# Patient Record
Sex: MALE | Race: WHITE | NOT HISPANIC OR LATINO | Employment: FULL TIME | URBAN - METROPOLITAN AREA
[De-identification: names, ages, dates, MRNs, and addresses within clinical notes are randomized per-mention and may not be internally consistent; named-entity substitution may affect disease eponyms.]

---

## 2017-01-11 RX ORDER — HYDROCODONE BITARTRATE AND ACETAMINOPHEN 10; 325 MG/1; MG/1
TABLET ORAL
Qty: 90 TABLET | Refills: 0 | Status: SHIPPED | OUTPATIENT
Start: 2017-01-11 | End: 2017-02-01 | Stop reason: SDUPTHER

## 2017-01-11 NOTE — TELEPHONE ENCOUNTER
----- Message from Mikayla Torres sent at 1/11/2017  9:40 AM CST -----  Contact: pt [phone]  RX request - refill or new RX.  Is this a refill or new RX:  refill  RX name and strength: hydrocodone-acetaminophen 10-325mg (NORCO)  mg Tab  Directions:   Is this a 30 day or 90 day RX:  30    Comments:

## 2017-02-01 ENCOUNTER — OFFICE VISIT (OUTPATIENT)
Dept: INTERNAL MEDICINE | Facility: CLINIC | Age: 62
End: 2017-02-01
Payer: COMMERCIAL

## 2017-02-01 ENCOUNTER — LAB VISIT (OUTPATIENT)
Dept: LAB | Facility: HOSPITAL | Age: 62
End: 2017-02-01
Attending: INTERNAL MEDICINE
Payer: COMMERCIAL

## 2017-02-01 VITALS
HEART RATE: 100 BPM | TEMPERATURE: 98 F | DIASTOLIC BLOOD PRESSURE: 72 MMHG | RESPIRATION RATE: 16 BRPM | BODY MASS INDEX: 36.77 KG/M2 | WEIGHT: 220.69 LBS | HEIGHT: 65 IN | SYSTOLIC BLOOD PRESSURE: 120 MMHG

## 2017-02-01 DIAGNOSIS — M79.18 CHRONIC MUSCULOSKELETAL PAIN: ICD-10-CM

## 2017-02-01 DIAGNOSIS — I73.9 PAD (PERIPHERAL ARTERY DISEASE): ICD-10-CM

## 2017-02-01 DIAGNOSIS — I10 HTN (HYPERTENSION), BENIGN: Primary | ICD-10-CM

## 2017-02-01 DIAGNOSIS — G89.29 CHRONIC MUSCULOSKELETAL PAIN: ICD-10-CM

## 2017-02-01 DIAGNOSIS — E78.5 DYSLIPIDEMIA: ICD-10-CM

## 2017-02-01 DIAGNOSIS — R73.03 PREDIABETES: ICD-10-CM

## 2017-02-01 DIAGNOSIS — F17.200 TOBACCO USE DISORDER: ICD-10-CM

## 2017-02-01 DIAGNOSIS — F11.90 CHRONIC, CONTINUOUS USE OF OPIOIDS: ICD-10-CM

## 2017-02-01 LAB
ALBUMIN SERPL BCP-MCNC: 3.5 G/DL
ALP SERPL-CCNC: 88 U/L
ALT SERPL W/O P-5'-P-CCNC: 32 U/L
ANION GAP SERPL CALC-SCNC: 7 MMOL/L
AST SERPL-CCNC: 48 U/L
BILIRUB SERPL-MCNC: 0.6 MG/DL
BUN SERPL-MCNC: 11 MG/DL
CALCIUM SERPL-MCNC: 9.1 MG/DL
CHLORIDE SERPL-SCNC: 100 MMOL/L
CO2 SERPL-SCNC: 31 MMOL/L
CREAT SERPL-MCNC: 0.9 MG/DL
EST. GFR  (AFRICAN AMERICAN): >60 ML/MIN/1.73 M^2
EST. GFR  (NON AFRICAN AMERICAN): >60 ML/MIN/1.73 M^2
GLUCOSE SERPL-MCNC: 136 MG/DL
POTASSIUM SERPL-SCNC: 4.8 MMOL/L
PROT SERPL-MCNC: 7.3 G/DL
SODIUM SERPL-SCNC: 138 MMOL/L

## 2017-02-01 PROCEDURE — 99999 PR PBB SHADOW E&M-EST. PATIENT-LVL IV: CPT | Mod: PBBFAC,,, | Performed by: INTERNAL MEDICINE

## 2017-02-01 PROCEDURE — 3074F SYST BP LT 130 MM HG: CPT | Mod: S$GLB,,, | Performed by: INTERNAL MEDICINE

## 2017-02-01 PROCEDURE — 36415 COLL VENOUS BLD VENIPUNCTURE: CPT | Mod: PO

## 2017-02-01 PROCEDURE — 99214 OFFICE O/P EST MOD 30 MIN: CPT | Mod: S$GLB,,, | Performed by: INTERNAL MEDICINE

## 2017-02-01 PROCEDURE — 80053 COMPREHEN METABOLIC PANEL: CPT

## 2017-02-01 PROCEDURE — 83036 HEMOGLOBIN GLYCOSYLATED A1C: CPT

## 2017-02-01 PROCEDURE — 3078F DIAST BP <80 MM HG: CPT | Mod: S$GLB,,, | Performed by: INTERNAL MEDICINE

## 2017-02-01 RX ORDER — GABAPENTIN 300 MG/1
600 CAPSULE ORAL 3 TIMES DAILY
Qty: 180 CAPSULE | Refills: 6 | Status: SHIPPED | OUTPATIENT
Start: 2017-02-01 | End: 2017-09-04 | Stop reason: SDUPTHER

## 2017-02-01 RX ORDER — CYCLOBENZAPRINE HCL 10 MG
10 TABLET ORAL 3 TIMES DAILY
Qty: 90 TABLET | Refills: 0 | Status: SHIPPED | OUTPATIENT
Start: 2017-02-01 | End: 2017-04-24 | Stop reason: SDUPTHER

## 2017-02-01 RX ORDER — PRAVASTATIN SODIUM 40 MG/1
TABLET ORAL
Qty: 30 TABLET | Refills: 6 | Status: SHIPPED | OUTPATIENT
Start: 2017-02-01 | End: 2017-09-22 | Stop reason: SDUPTHER

## 2017-02-01 RX ORDER — HYDROCODONE BITARTRATE AND ACETAMINOPHEN 10; 325 MG/1; MG/1
TABLET ORAL
Qty: 90 TABLET | Refills: 0 | Status: SHIPPED | OUTPATIENT
Start: 2017-02-10 | End: 2017-03-08 | Stop reason: SDUPTHER

## 2017-02-01 RX ORDER — LISINOPRIL 20 MG/1
20 TABLET ORAL DAILY
Qty: 30 TABLET | Refills: 6 | Status: SHIPPED | OUTPATIENT
Start: 2017-02-01 | End: 2017-10-27 | Stop reason: SDUPTHER

## 2017-02-01 NOTE — PROGRESS NOTES
This office note has been dictated.  HISTORY OF PRESENT ILLNESS:  A 61-year-old male in today following up on several   chronic medical conditions including hypertension, prediabetes, dyslipidemia,   obesity, PAD, tobacco use disorder, chronic musculoskeletal pain with chronic   opioid use.  The patient reports that all is generally stable at this time, his   pain medication use is stable as are the pain issues.  Continues to smoke with   no expectation of cessation.  Denies any cardiac type chest pain, no   claudication symptoms.  No change in breathing status.  Takes all medications as   directed.    CURRENT MEDICATIONS:  All medications noted and reviewed in the electronic   medical record medication list.    REVIEW OF SYSTEMS:  CONSTITUTIONAL:  No fever, no chills, no generalized body aches.  CARDIOVASCULAR:  No chest pain, palpitations or syncope.  No significant lower   extremity edema, no claudication symptomatologies.  GASTROINTESTINAL:  No nausea, vomiting, abdominal pain or diarrhea or change in   bowel habits.  GENITOURINARY:  No change in color or character of his urine.  MUSCULOSKELETAL:  Chronic lower back pain.  NEUROLOGIC:  He does have some numbness and burning in his feet that he notices   when he is up on his feet for long periods.    PAST MEDICAL HISTORY, PAST SURGICAL HISTORY, FAMILY AND SOCIAL HISTORY:  All   noted and reviewed in the electronic medical record history section.    PHYSICAL EXAMINATION:  GENERAL:  Alert male in no acute distress.  VITAL SIGNS:  Blood pressure taken manually by this examiner is 120/70.  Other   vital signs noted and reviewed as normal.  HEENT:  Normocephalic.  NECK:  Supple, no masses, no thyromegaly.  LUNGS:  Clear to auscultation and normal to percussion.  CARDIOVASCULAR:  Regular rate and rhythm.  No significant murmur.  Carotids are   full bilaterally without bruits.  Pedal pulses are not readily palpable.  There   are no overt ischemic changes of lower  extremities.  No abdominal bruit.  GASTROINTESTINAL:  The abdomen is obese, soft and benign.  MENTAL STATUS:  Alert and oriented.  Affect and mood all appropriate.    IMPRESSION:  1.  Hypertension, well controlled.  2.  Obesity, associated with hypertension, prediabetes and dyslipidemia.  3.  Prediabetes/diabetes, due for update.  4.  Dyslipidemia, on statin therapy.  5.  Peripheral arterial disease as per diagnostic studies.  6.  Continuous tobacco use disorder.  7.  Chronic musculoskeletal pain with chronic opioid use, stable.    PLAN:  1.  Update chemistry profile, glycohemoglobin.  2.  Urine toxicology screen for compliance.  3.  Low-dose CT scan chest for screening.  4.  Declines colonoscopy and any immunizations.  5.  Given a refill of his hydrocodone preparation dated 02/10/2017 for one month   supply.  6.  Return to clinic in July for general health assessment.  7.  Encouraged smoking cessation and increased physical activity with weight   loss.      MARY ANNE  dd: 02/01/2017 10:56:29 (CST)  td: 02/02/2017 04:15:12 (CST)  Doc ID   #[id number]  Job ID #[id number]    CC:

## 2017-02-01 NOTE — MR AVS SNAPSHOT
[place] [facility]   [facility]  [place] [address]  Phone: [phone]  Fax: [phone]                  Madhu Grant   2017 9:40 AM   Office Visit    Description:  Male : 1955   Provider:  ANDREW Sheridan MD   Department:  [place] [facility]           Reason for Visit     6 month f/u     Hypertension     prediabetes           Diagnoses this Visit        Comments    HTN (hypertension), benign    -  Primary     Prediabetes         Dyslipidemia         PAD (peripheral artery disease)         Obesity, Class II, BMI 35.0-39.9, with comorbidity (see actual BMI)         Tobacco use disorder         Chronic, continuous use of opioids         Chronic musculoskeletal pain                To Do List           Future Appointments        Provider Department Dept Phone    2/15/2017 10:40 AM [facility] CT6 MOBILE LIMIT [address] [facility] [phone]      Goals (5 Years of Data)     None      Follow-Up and Disposition     Return in about 6 months (around 2017).       These Medications        Disp Refills Start End    lisinopril (PRINIVIL,ZESTRIL) 20 MG tablet 30 tablet 6 2017     Take 1 tablet (20 mg total) by mouth once daily. - Oral    Pharmacy: [address] [place] [address] Ph #: [phone]       gabapentin (NEURONTIN) 300 MG capsule 180 capsule 6 2017     Take 2 capsules (600 mg total) by mouth 3 (three) times daily. - Oral    [facility] [address] Ph #: [phone]       pravastatin (PRAVACHOL) 40 MG tablet 30 tablet 6 2017     TAKE ONE TABLET BY MOUTH ONCE DAILY IN THE EVENING    Pharmacy: [facility], [address] Ph #: [phone]       cyclobenzaprine (FLEXERIL) 10 MG tablet 90 tablet 0 2017     Take 1 tablet (10 mg total) by mouth 3 (three) times  daily. - Oral    Pharmacy: [facility] [place] [facility] [place] - [address] [facility] Ph #: [phone]       hydrocodone-acetaminophen 10-325mg (NORCO)  mg Tab 90 tablet 0 2/10/2017     TAKE ONE TABLET BY MOUTH EVERY 4 TO 6 HOURS AS NEEDED FOR PAIN    Pharmacy: [facility] - [place] [place] - [address] [facility] Ph #: [phone]         [facility]     [facility] On Call Nurse Care Line - 24/7 Assistance  Registered nurses in the [facility] provide clinical advisement, health education, appointment booking, and other advisory services.  Call for this free service at [phone].             Medications           Message regarding Medications     Verify the changes and/or additions to your medication regime listed below are the same as discussed with your clinician today.  If any of these changes or additions are incorrect, please notify your healthcare provider.        CHANGE how you are taking these medications     Start Taking Instead of    cyclobenzaprine (FLEXERIL) 10 MG tablet cyclobenzaprine (FLEXERIL) 10 MG tablet    Dosage:  Take 1 tablet (10 mg total) by mouth 3 (three) times daily. Dosage:  TAKE ONE TABLET BY MOUTH THREE TIMES DAILY    Reason for Change:  Reorder            Verify that the below list of medications is an accurate representation of the medications you are currently taking.  If none reported, the list may be blank. If incorrect, please contact your healthcare provider. Carry this list with you in case of emergency.           Current Medications     aspirin (ECOTRIN) 81 MG EC tablet Take 81 mg by mouth once daily.    cyclobenzaprine (FLEXERIL) 10 MG tablet Take 1 tablet (10 mg total) by mouth 3 (three) times daily.    DIPHENHYDRAMINE HCL (BENADRYL ALLERGY ORAL) Take by mouth.    gabapentin (NEURONTIN) 300 MG capsule Take 2 capsules (600 mg total) by mouth 3 (three) times daily.    hydrocodone-acetaminophen 10-325mg  "(NORCO)  mg Tab Starting on Feb 10, 2017. TAKE ONE TABLET BY MOUTH EVERY 4 TO 6 HOURS AS NEEDED FOR PAIN    lidocaine 4 % Gel Apply topically.      lidocaine HCL 2% (XYLOCAINE) 2 % jelly APPLY   TOPICALLY TO AFFECTED AREA TWICE DAILY TO THREE TIMES DAILY    lisinopril (PRINIVIL,ZESTRIL) 20 MG tablet Take 1 tablet (20 mg total) by mouth once daily.    pravastatin (PRAVACHOL) 40 MG tablet TAKE ONE TABLET BY MOUTH ONCE DAILY IN THE EVENING    zolpidem (AMBIEN) 10 mg Tab TAKE ONE TABLET BY MOUTH NIGHTLY AS NEEDED           Clinical Reference Information           Vital Signs - Last Recorded  Most recent update: 2/1/2017  9:48 AM by José Chaney LPN    BP Pulse Temp Resp Ht Wt    120/72 (BP Location: Right arm, Patient Position: Sitting, BP Method: Manual) 100 97.6 °F (36.4 °C) (Oral) 16 5' 5" (1.651 m) 100.1 kg (220 lb 10.9 oz)    BMI                36.72 kg/m2          Blood Pressure          Most Recent Value    BP  120/72      Allergies as of 2/1/2017     Iodine    Latex, Natural Rubber    Venom-honey Bee      Immunizations Administered on Date of Encounter - 2/1/2017     None      Orders Placed During Today's Visit     Future Labs/Procedures Expected by Expires    Comprehensive metabolic panel  2/1/2017 2/1/2018    CT Chest Lung Screening Low Dose  2/1/2017 2/2/2018    Hemoglobin A1c  2/1/2017 2/1/2018    Toxicology screen, urine  2/1/2017 4/2/2018      [facility] Sign-Up     Activating your [facility] account is as easy as 1-2-3!     1) Visit [email or web address], select Sign Up Now, enter this activation code and your date of birth, then select Next.  [email or web address]  Expires: 3/18/2017 10:57 AM      2) Create a username and password to use when you visit [facility] in the future and select a security question in case you lose your password and select Next.    3) Enter your e-mail address and click Sign Up!    Additional Information  If you have questions, please e-mail [email or web address] or call " [phone] to talk to our [facility] staff. Remember, [facility] is NOT to be used for urgent needs. For medical emergencies, dial 911.         Smoking Cessation     If you would like to quit smoking:   You may be eligible for free services if you are a [place] resident and started smoking cigarettes before September 1, 1988.  Call the [employer]oking [employer]essation [employer] (SCT) toll free at (709) [phone] or ([phone] 569-7069.   Call 8-800-QUIT-NOW if you do not meet the above criteria.

## 2017-02-02 LAB
ESTIMATED AVG GLUCOSE: 148 MG/DL
HBA1C MFR BLD HPLC: 6.8 %

## 2017-02-06 RX ORDER — ZOLPIDEM TARTRATE 10 MG/1
TABLET ORAL
Qty: 30 TABLET | Refills: 0 | Status: SHIPPED | OUTPATIENT
Start: 2017-02-06 | End: 2017-10-03 | Stop reason: SDUPTHER

## 2017-03-08 RX ORDER — HYDROCODONE BITARTRATE AND ACETAMINOPHEN 10; 325 MG/1; MG/1
TABLET ORAL
Qty: 90 TABLET | Refills: 0 | Status: SHIPPED | OUTPATIENT
Start: 2017-03-08 | End: 2017-04-10 | Stop reason: SDUPTHER

## 2017-03-08 NOTE — TELEPHONE ENCOUNTER
----- Message from April Alex sent at 3/8/2017 12:36 PM CST -----  Contact: Pt at [phone]  RX request - refill or new RX.  Is this a refill or new RX:  refill  RX name and strength: hydrocodone-acetaminophen 10-325mg (NORCO)  mg Tab  Directions: TAKE ONE TABLET BY MOUTH EVERY 4 TO 6 HOURS AS NEEDED FOR PAIN  Is this a 30 day or 90 day RX:  tablet  Pharmacy name and phone #: Pt  at [facility]  Comments:

## 2017-04-10 NOTE — TELEPHONE ENCOUNTER
----- Message from Angela Singh sent at 4/10/2017 12:34 PM CDT -----  Contact: Mobile: [phone]   RX request - refill or new RX.  Is this a refill or new RX:  Refill   RX name and strength: Hydrocodone-Acetaminophen 10-325mg (NORCO)  mg Tab  Directions:   Is this a 30 day or 90 day RX:  30  Pharmacy name and phone #:  Call when ready for    Comments:

## 2017-04-11 RX ORDER — HYDROCODONE BITARTRATE AND ACETAMINOPHEN 10; 325 MG/1; MG/1
TABLET ORAL
Qty: 90 TABLET | Refills: 0 | Status: SHIPPED | OUTPATIENT
Start: 2017-04-11 | End: 2017-05-10 | Stop reason: SDUPTHER

## 2017-04-24 RX ORDER — CYCLOBENZAPRINE HCL 10 MG
TABLET ORAL
Qty: 90 TABLET | Refills: 0 | Status: SHIPPED | OUTPATIENT
Start: 2017-04-24 | End: 2017-07-03 | Stop reason: SDUPTHER

## 2017-05-10 RX ORDER — HYDROCODONE BITARTRATE AND ACETAMINOPHEN 10; 325 MG/1; MG/1
TABLET ORAL
Qty: 90 TABLET | Refills: 0 | Status: SHIPPED | OUTPATIENT
Start: 2017-05-10 | End: 2017-06-07 | Stop reason: SDUPTHER

## 2017-05-10 NOTE — TELEPHONE ENCOUNTER
----- Message from Debby Barros sent at 5/10/2017  9:33 AM CDT -----  Contact: Self/[phone]  RX request - refill or new RX.  Is this a refill or new RX:  refill  RX name and strength: hydrocodone-acetaminophen 10-325mg (NORCO)  mg Tab  Directions: TAKE ONE TABLET BY MOUTH EVERY 4 TO 6 HOURS AS NEEDED FOR PAIN  Is this a 30 day or 90 day RX:  90  Pharmacy name and phone #: pt  script  Comments:  Pt is requesting a call to be picked up

## 2017-06-07 RX ORDER — HYDROCODONE BITARTRATE AND ACETAMINOPHEN 10; 325 MG/1; MG/1
TABLET ORAL
Qty: 90 TABLET | Refills: 0 | Status: SHIPPED | OUTPATIENT
Start: 2017-06-07 | End: 2017-07-06 | Stop reason: SDUPTHER

## 2017-06-07 NOTE — TELEPHONE ENCOUNTER
----- Message from Brandyn Lugo sent at 6/7/2017  9:32 AM CDT -----  Contact: Patient   RX request - refill or new RX.  Is this a refill or new RX:  Refill   RX name and strength: hydrocodone-acetaminophen 10-325mg (NORCO)  mg Tab   Directions: TAKE ONE TABLET BY MOUTH EVERY 4 TO 6 HOURS AS NEEDED FOR PAIN  Is this a 30 day or 90 day RX:  30  Please call pt once RX ready for pick-up

## 2017-07-03 RX ORDER — CYCLOBENZAPRINE HCL 10 MG
TABLET ORAL
Qty: 90 TABLET | Refills: 0 | Status: SHIPPED | OUTPATIENT
Start: 2017-07-03 | End: 2018-03-26 | Stop reason: SDUPTHER

## 2017-07-06 RX ORDER — HYDROCODONE BITARTRATE AND ACETAMINOPHEN 10; 325 MG/1; MG/1
TABLET ORAL
Qty: 90 TABLET | Refills: 0 | Status: SHIPPED | OUTPATIENT
Start: 2017-07-06 | End: 2017-08-07 | Stop reason: SDUPTHER

## 2017-07-06 NOTE — TELEPHONE ENCOUNTER
----- Message from Sheron Bran sent at 7/6/2017 12:34 PM CDT -----  Contact: Patient, phone [phone]  Refill    hydrocodone-acetaminophen 10-325mg (NORCO)  mg Tab   Sig: TAKE ONE TABLET BY MOUTH EVERY 4 TO 6 HOURS AS NEEDED FOR PAIN    Please call the patient and let him know he can pick this up.      Thanks!

## 2017-08-07 NOTE — TELEPHONE ENCOUNTER
----- Message from Jodie Leyva sent at 8/7/2017  9:42 AM CDT -----  Contact: [phone]  Patient would like to  his Rx for hydrocodone-acetaminophen 10-325mg (NORCO)

## 2017-08-08 RX ORDER — HYDROCODONE BITARTRATE AND ACETAMINOPHEN 10; 325 MG/1; MG/1
TABLET ORAL
Qty: 90 TABLET | Refills: 0 | Status: SHIPPED | OUTPATIENT
Start: 2017-08-08 | End: 2017-09-05 | Stop reason: SDUPTHER

## 2017-08-08 NOTE — TELEPHONE ENCOUNTER
----- Message from Marisela Rodriguez sent at 8/8/2017 12:38 PM CDT -----  Contact: patient [phone]  Pt said that he called on Friday to request a rx for pain medication and will be out of meds tomorrow.

## 2017-08-09 NOTE — TELEPHONE ENCOUNTER
Left message on home # to advise that rx is ready for .    However, was able to reach pt on cell # to advise that his rx is ready for .    Verbalized understanding.

## 2017-09-05 RX ORDER — GABAPENTIN 300 MG/1
CAPSULE ORAL
Qty: 180 CAPSULE | Refills: 6 | Status: SHIPPED | OUTPATIENT
Start: 2017-09-05 | End: 2018-03-26 | Stop reason: SDUPTHER

## 2017-09-05 NOTE — TELEPHONE ENCOUNTER
----- Message from Rohit Lewis sent at 9/5/2017 10:35 AM CDT -----  Contact: Self [phone]  Type: Rx    Name of medication(s): hydrocodone-acetaminophen 10-325mg (NORCO)  mg Tab    Is this a refill? New rx? Refill    Who prescribed medication? Dr Sheridan     Pharmacy Name, Phone, & Location: Pt will     Comments:Advice    Thanks

## 2017-09-06 RX ORDER — HYDROCODONE BITARTRATE AND ACETAMINOPHEN 10; 325 MG/1; MG/1
TABLET ORAL
Qty: 90 TABLET | Refills: 0 | Status: SHIPPED | OUTPATIENT
Start: 2017-09-06 | End: 2017-10-04 | Stop reason: SDUPTHER

## 2017-09-25 DIAGNOSIS — Z12.11 COLON CANCER SCREENING: ICD-10-CM

## 2017-09-25 RX ORDER — PRAVASTATIN SODIUM 40 MG/1
TABLET ORAL
Qty: 30 TABLET | Refills: 6 | Status: SHIPPED | OUTPATIENT
Start: 2017-09-25 | End: 2018-04-26 | Stop reason: SDUPTHER

## 2017-10-03 RX ORDER — ZOLPIDEM TARTRATE 10 MG/1
TABLET ORAL
Qty: 30 TABLET | Refills: 1 | Status: SHIPPED | OUTPATIENT
Start: 2017-10-03 | End: 2018-10-15

## 2017-10-04 RX ORDER — HYDROCODONE BITARTRATE AND ACETAMINOPHEN 10; 325 MG/1; MG/1
TABLET ORAL
Qty: 90 TABLET | Refills: 0 | Status: SHIPPED | OUTPATIENT
Start: 2017-10-04 | End: 2017-11-01 | Stop reason: SDUPTHER

## 2017-10-04 NOTE — TELEPHONE ENCOUNTER
"----- Message from Naomi Matamoros sent at 10/4/2017 12:09 PM CDT -----  Contact: Self/[phone]      RX request - refill or new RX.  Is this a refill or new RX: Refill    RX name and strength: hydrocodone-acetaminophen 10-325mg (NORCO)  mg Tab  Directions: TAKE ONE TABLET BY MOUTH EVERY 4 TO 6 HOURS AS NEEDED FOR PAIN  Is this a 30 day or 90 day RX:    Pharmacy name and phone # (DON'T enter "on file" or "in chart"):   Comments:  Patient will come and  his rx.        "

## 2017-10-30 RX ORDER — LISINOPRIL 20 MG/1
TABLET ORAL
Qty: 30 TABLET | Refills: 6 | Status: SHIPPED | OUTPATIENT
Start: 2017-10-30 | End: 2018-05-23 | Stop reason: SDUPTHER

## 2017-11-01 RX ORDER — HYDROCODONE BITARTRATE AND ACETAMINOPHEN 10; 325 MG/1; MG/1
TABLET ORAL
Qty: 90 TABLET | Refills: 0 | Status: SHIPPED | OUTPATIENT
Start: 2017-11-01 | End: 2017-11-29 | Stop reason: SDUPTHER

## 2017-11-01 NOTE — TELEPHONE ENCOUNTER
----- Message from April Alex sent at 11/1/2017  8:29 AM CDT -----  Contact: Pt at [phone]  RX request - refill or new RX.  Is this a refill or new RX: refill   RX name and strength:    hydrocodone-acetaminophen 10-325mg (NORCO)  mg Tab    Pharmacy name and phone # : Pt  script at [facility]  Comments:

## 2017-11-02 NOTE — TELEPHONE ENCOUNTER
Left a message for pt to advise that his rx is ready for .    Noted that pt is scheduled for OV on 11/8/17.

## 2017-11-03 ENCOUNTER — TELEPHONE (OUTPATIENT)
Dept: INTERNAL MEDICINE | Facility: CLINIC | Age: 62
End: 2017-11-03

## 2017-11-03 NOTE — TELEPHONE ENCOUNTER
Left a message for pt at his home # and spoke with him today on his cell, to advise that his rx is ready for .    Verbalized understanding.

## 2017-11-03 NOTE — TELEPHONE ENCOUNTER
----- Message from Marisela Rodriguez sent at 11/3/2017  8:28 AM CDT -----  Contact: patient [phone] cell   Pt called to ask if his norco rx is ready. He called on Wednesday and didn't leave his cell number. Please call him at the number above.

## 2017-11-08 ENCOUNTER — OFFICE VISIT (OUTPATIENT)
Dept: INTERNAL MEDICINE | Facility: CLINIC | Age: 62
End: 2017-11-08
Payer: COMMERCIAL

## 2017-11-08 VITALS
WEIGHT: 225.75 LBS | HEART RATE: 93 BPM | SYSTOLIC BLOOD PRESSURE: 106 MMHG | DIASTOLIC BLOOD PRESSURE: 62 MMHG | RESPIRATION RATE: 20 BRPM | TEMPERATURE: 98 F | HEIGHT: 66 IN | BODY MASS INDEX: 36.28 KG/M2

## 2017-11-08 DIAGNOSIS — E11.69 DYSLIPIDEMIA ASSOCIATED WITH TYPE 2 DIABETES MELLITUS: ICD-10-CM

## 2017-11-08 DIAGNOSIS — E78.5 DYSLIPIDEMIA ASSOCIATED WITH TYPE 2 DIABETES MELLITUS: ICD-10-CM

## 2017-11-08 DIAGNOSIS — E11.42 TYPE 2 DIABETES MELLITUS WITH PERIPHERAL NEUROPATHY: Primary | ICD-10-CM

## 2017-11-08 DIAGNOSIS — E11.59 HYPERTENSION ASSOCIATED WITH DIABETES: ICD-10-CM

## 2017-11-08 DIAGNOSIS — Z12.5 SCREENING FOR PROSTATE CANCER: ICD-10-CM

## 2017-11-08 DIAGNOSIS — I15.2 HYPERTENSION ASSOCIATED WITH DIABETES: ICD-10-CM

## 2017-11-08 DIAGNOSIS — F11.90 CHRONIC, CONTINUOUS USE OF OPIOIDS: ICD-10-CM

## 2017-11-08 DIAGNOSIS — M54.16 LUMBAR RADICULAR SYNDROME: ICD-10-CM

## 2017-11-08 DIAGNOSIS — Z12.11 SCREEN FOR COLON CANCER: ICD-10-CM

## 2017-11-08 PROCEDURE — 99214 OFFICE O/P EST MOD 30 MIN: CPT | Mod: S$GLB,,, | Performed by: INTERNAL MEDICINE

## 2017-11-08 PROCEDURE — 99999 PR PBB SHADOW E&M-EST. PATIENT-LVL IV: CPT | Mod: PBBFAC,,, | Performed by: INTERNAL MEDICINE

## 2017-11-08 NOTE — PROGRESS NOTES
This office note has been dictated.  HISTORY OF PRESENT ILLNESS:  This is a 62-year-old gentleman with hypertension,   dyslipidemia, diabetes mellitus, currently diet-controlled, chronic   musculoskeletal pain and lumbar radiculopathy with chronic opioid use for such,   following up on those issues.  The patient reports that issues have worsened   with regards to his issues of radiculopathy or other.  He has had several falls   involved turning, stepping out where he feels like his legs go numb.  He is not   having increasing pain in his leg though he does have the chronic pain in his   lower back.  This is left-sided symptomatology.  Denies any bowel or urinary   incontinence.  He reports taking all of his medications as directed and does   have regular use of opioids as well as gabapentin.  He continues to smoke, but   with no anticipation of cessation.    CURRENT MEDICATIONS:  All medications are noted and reviewed in our electronic   medical record medication list.    REVIEW OF SYSTEMS:  CONSTITUTIONAL:  No fever, chills or unexpected weight changes.  HEENT:  No hoarseness.  No dysphagia.  RESPIRATORY:  No cough or change in breathing status.  CARDIOVASCULAR:  No chest pain, palpitations, syncope or presyncope.  Denies   overt claudication.  GASTROINTESTINAL:  No bowel incontinence.  GENITOURINARY:  No urinary incontinence.    PAST MEDICAL HISTORY:  Includes diet-controlled diabetes mellitus, diabetic   peripheral neuropathy, hypertension, dyslipidemia, chronic musculoskeletal and   neurologic pain, chronic opioid use, lumbar radiculopathy syndrome and tobacco   use disorder.    PHYSICAL EXAMINATION:  GENERAL:  Alert male in no acute distress.  VITAL SIGNS:  All noted and reviewed as normal.  EYES:  Sclerae are white and nonicteric.  HEENT:  Normocephalic.  NECK:  Supple.  No masses.  No thyromegaly.  LUNGS:  Clear to auscultation.  CARDIOVASCULAR:  Regular rate and rhythm.  There is no significant murmur.     Carotids are full bilaterally and without bruits.  EXTREMITIES:  There is distal trace lower extremity edema.  No overt ischemic   changes of the lower extremities.  GASTROINTESTINAL:  The abdomen is soft, benign and obese.  NEUROLOGIC:  Lower extremities, no gross motor deficits.  Gait is normal.  MENTAL STATUS:  Alert and oriented.  Affect and mood are all appropriate.    DATA:  Most recent lab data noted and reviewed from February of this year.    SCREENINGS:  MRI was performed at our institution in 2006, of the lumbar spine.    IMPRESSION:  1.  The patient is with what seems to be neuropathic issues with the left lower   extremity, lumbar radiculopathy or other, seems to be progressing.  2.  Diabetes mellitus, diet controlled.  3.  Diabetic peripheral neuropathy.  4.  Hypertension, well controlled.  5.  Dyslipidemia, on statin therapy.  6.  Tobacco use disorder.  7.  Hepatic steatosis.    PLAN:  1.  Update lab data to include CBC, chemistry profile, lipid profile, PSA, TSH,   urinalysis and glycohemoglobin.  Urine toxicology screen for validation.  2.  He declines colonoscopy.  We will schedule FIT stool test.  3.  Recommended MRI of the lumbar spine, but he indicates he cannot afford the   copayment for such at this time.  We will refer the patient to the Spine   Department for their review, evaluation and recommendations.  4.  Again, reminded and advised low-dose CT chest for screening, but he declines   due to the cost of the copayment.  5.  He declines any vaccinations.  6.  Follow up in 3 months.      ALLAN/IN  dd: 11/08/2017 17:43:26 (CST)  td: 11/09/2017 10:16:49 (CST)  Doc ID   #[id number]  Job ID #[id number]    CC:

## 2017-11-09 ENCOUNTER — LAB VISIT (OUTPATIENT)
Dept: LAB | Facility: HOSPITAL | Age: 62
End: 2017-11-09
Attending: INTERNAL MEDICINE
Payer: COMMERCIAL

## 2017-11-09 DIAGNOSIS — E11.42 TYPE 2 DIABETES MELLITUS WITH PERIPHERAL NEUROPATHY: ICD-10-CM

## 2017-11-09 DIAGNOSIS — I15.2 HYPERTENSION ASSOCIATED WITH DIABETES: ICD-10-CM

## 2017-11-09 DIAGNOSIS — Z12.5 SCREENING FOR PROSTATE CANCER: ICD-10-CM

## 2017-11-09 DIAGNOSIS — E11.59 HYPERTENSION ASSOCIATED WITH DIABETES: ICD-10-CM

## 2017-11-09 LAB
ALBUMIN SERPL BCP-MCNC: 2.8 G/DL
ALP SERPL-CCNC: 153 U/L
ALT SERPL W/O P-5'-P-CCNC: 24 U/L
ANION GAP SERPL CALC-SCNC: 8 MMOL/L
AST SERPL-CCNC: 55 U/L
BASOPHILS # BLD AUTO: 0.05 K/UL
BASOPHILS NFR BLD: 0.4 %
BILIRUB SERPL-MCNC: 0.7 MG/DL
BUN SERPL-MCNC: 8 MG/DL
CALCIUM SERPL-MCNC: 9.1 MG/DL
CHLORIDE SERPL-SCNC: 101 MMOL/L
CHOLEST SERPL-MCNC: 154 MG/DL
CHOLEST/HDLC SERPL: 3.7 {RATIO}
CO2 SERPL-SCNC: 30 MMOL/L
COMPLEXED PSA SERPL-MCNC: 0.6 NG/ML
CREAT SERPL-MCNC: 0.9 MG/DL
DIFFERENTIAL METHOD: ABNORMAL
EOSINOPHIL # BLD AUTO: 0.3 K/UL
EOSINOPHIL NFR BLD: 2.9 %
ERYTHROCYTE [DISTWIDTH] IN BLOOD BY AUTOMATED COUNT: 17.5 %
EST. GFR  (AFRICAN AMERICAN): >60 ML/MIN/1.73 M^2
EST. GFR  (NON AFRICAN AMERICAN): >60 ML/MIN/1.73 M^2
ESTIMATED AVG GLUCOSE: 157 MG/DL
GLUCOSE SERPL-MCNC: 147 MG/DL
HBA1C MFR BLD HPLC: 7.1 %
HCT VFR BLD AUTO: 40.6 %
HDLC SERPL-MCNC: 42 MG/DL
HDLC SERPL: 27.3 %
HGB BLD-MCNC: 12.7 G/DL
IMM GRANULOCYTES # BLD AUTO: 0.1 K/UL
IMM GRANULOCYTES NFR BLD AUTO: 0.8 %
LDLC SERPL CALC-MCNC: 90 MG/DL
LYMPHOCYTES # BLD AUTO: 1.4 K/UL
LYMPHOCYTES NFR BLD: 11.8 %
MCH RBC QN AUTO: 28.2 PG
MCHC RBC AUTO-ENTMCNC: 31.3 G/DL
MCV RBC AUTO: 90 FL
MONOCYTES # BLD AUTO: 1.1 K/UL
MONOCYTES NFR BLD: 9.3 %
NEUTROPHILS # BLD AUTO: 8.9 K/UL
NEUTROPHILS NFR BLD: 74.8 %
NONHDLC SERPL-MCNC: 112 MG/DL
NRBC BLD-RTO: 0 /100 WBC
PLATELET # BLD AUTO: 251 K/UL
PMV BLD AUTO: 11 FL
POTASSIUM SERPL-SCNC: 4.5 MMOL/L
PROT SERPL-MCNC: 7.2 G/DL
RBC # BLD AUTO: 4.51 M/UL
SODIUM SERPL-SCNC: 139 MMOL/L
TRIGL SERPL-MCNC: 110 MG/DL
TSH SERPL DL<=0.005 MIU/L-ACNC: 1.89 UIU/ML
WBC # BLD AUTO: 11.9 K/UL

## 2017-11-09 PROCEDURE — 36415 COLL VENOUS BLD VENIPUNCTURE: CPT | Mod: PO

## 2017-11-09 PROCEDURE — 83036 HEMOGLOBIN GLYCOSYLATED A1C: CPT

## 2017-11-09 PROCEDURE — 80061 LIPID PANEL: CPT

## 2017-11-09 PROCEDURE — 85025 COMPLETE CBC W/AUTO DIFF WBC: CPT

## 2017-11-09 PROCEDURE — 84443 ASSAY THYROID STIM HORMONE: CPT

## 2017-11-09 PROCEDURE — 84153 ASSAY OF PSA TOTAL: CPT

## 2017-11-09 PROCEDURE — 80053 COMPREHEN METABOLIC PANEL: CPT

## 2017-11-20 DIAGNOSIS — M54.50 LUMBAR SPINE PAIN: Primary | ICD-10-CM

## 2017-11-27 ENCOUNTER — HOSPITAL ENCOUNTER (OUTPATIENT)
Dept: RADIOLOGY | Facility: HOSPITAL | Age: 62
Discharge: HOME | End: 2017-11-27
Attending: PHYSICIAN ASSISTANT
Payer: COMMERCIAL

## 2017-11-27 ENCOUNTER — INITIAL CONSULT (OUTPATIENT)
Dept: ORTHOPEDICS | Facility: CLINIC | Age: 62
End: 2017-11-27
Payer: COMMERCIAL

## 2017-11-27 VITALS — WEIGHT: 221.13 LBS | BODY MASS INDEX: 35.54 KG/M2 | HEIGHT: 66 IN

## 2017-11-27 DIAGNOSIS — M54.16 LUMBAR RADICULOPATHY: ICD-10-CM

## 2017-11-27 DIAGNOSIS — M43.10 SPONDYLOLISTHESIS, UNSPECIFIED SPINAL REGION: ICD-10-CM

## 2017-11-27 DIAGNOSIS — M51.36 DDD (DEGENERATIVE DISC DISEASE), LUMBAR: Primary | ICD-10-CM

## 2017-11-27 DIAGNOSIS — M54.50 LUMBAR SPINE PAIN: ICD-10-CM

## 2017-11-27 PROCEDURE — 99204 OFFICE O/P NEW MOD 45 MIN: CPT | Mod: S$GLB,,, | Performed by: PHYSICIAN ASSISTANT

## 2017-11-27 PROCEDURE — 72120 X-RAY BEND ONLY L-S SPINE: CPT | Mod: 26,,, | Performed by: RADIOLOGY

## 2017-11-27 PROCEDURE — 99999 PR PBB SHADOW E&M-EST. PATIENT-LVL III: CPT | Mod: PBBFAC,,, | Performed by: PHYSICIAN ASSISTANT

## 2017-11-27 PROCEDURE — 72100 X-RAY EXAM L-S SPINE 2/3 VWS: CPT | Mod: 26,,, | Performed by: RADIOLOGY

## 2017-11-27 PROCEDURE — 72100 X-RAY EXAM L-S SPINE 2/3 VWS: CPT | Mod: TC

## 2017-11-27 NOTE — PROGRESS NOTES
DATE: 11/27/2017  PATIENT: Madhu Grant    Supervising Physician: Jorge Cohen M.D.    CHIEF COMPLAINT: back and left leg pain    HISTORY:  Madhu Grant is a 62 y.o. male who smokes 2-3 ppd for the last 40+ years here for initial evaluation of low back and left lateral leg pain (Back - 3, Leg - 3).  The pain in the leg is what bothers him most.  The pain has been present for 2 years. The patient describes the pain as aching.  The pain is worse with standing and walking and improved by leaning forward over a shopping cart. There is associated numbness and tingling. There is subjective weakness.  He says his left leg gives out on him.  He reports falling several times last month due to his leg giving out.  Prior treatments have included gabapentin, hydrocodone, flexeril, physical therapy 5 years ago, and ESIs 10 years ago, but no recent ESIs or surgery.    The patient denies myelopathic symptoms such as handwriting changes or difficulty with buttons/coins/keys. Denies perineal paresthesias, bowel/bladder dysfunction.    PAST MEDICAL/SURGICAL HISTORY:  Past Medical History:   Diagnosis Date    Chronic back pain     Diabetes mellitus, type 2     Hepatic steatosis     Hyperlipidemia     Hypertension     PVD (peripheral vascular disease)     Tobacco use disorder      Past Surgical History:   Procedure Laterality Date    left hand      TONSILLECTOMY      UMBILICAL HERNIA REPAIR         Medications:   Current Outpatient Prescriptions on File Prior to Visit   Medication Sig Dispense Refill    aspirin (ECOTRIN) 81 MG EC tablet Take 81 mg by mouth once daily.      cyclobenzaprine (FLEXERIL) 10 MG tablet TAKE ONE TABLET (10 MG TOTAL) BY MOUTH THREE TIMES DAILY 90 tablet 0    DIPHENHYDRAMINE HCL (BENADRYL ALLERGY ORAL) Take by mouth.      gabapentin (NEURONTIN) 300 MG capsule TAKE TWO CAPSULES (600 MG TOTAL)BY MOUTH THREE TIMES DAILY 180 capsule 6    hydrocodone-acetaminophen 10-325mg (NORCO)  mg Tab  "TAKE ONE TABLET BY MOUTH EVERY 4 TO 6 HOURS AS NEEDED FOR PAIN 90 tablet 0    lidocaine 4 % Gel Apply topically.        lidocaine HCL 2% (XYLOCAINE) 2 % jelly APPLY   TOPICALLY TO AFFECTED AREA TWICE DAILY TO THREE TIMES DAILY 30 mL 0    lisinopril (PRINIVIL,ZESTRIL) 20 MG tablet TAKE ONE TABLET (20 MG TOTAL)BY MOUTH ONCE DAILY 30 tablet 6    pravastatin (PRAVACHOL) 40 MG tablet TAKE ONE TABLET BY MOUTH ONCE DAILY IN THE EVENING 30 tablet 6    zolpidem (AMBIEN) 10 mg Tab TAKE ONE TABLET BY MOUTH AT BEDTIME AS NEEDED 30 tablet 1     No current facility-administered medications on file prior to visit.        Social History:   Social History     Social History    Marital status:      Spouse name: N/A    Number of children: N/A    Years of education: N/A     Occupational History     [employer]     Social History Main Topics    Smoking status: Current Every Day Smoker    Smokeless tobacco: Current User    Alcohol use Yes    Drug use: No    Sexual activity: Yes     Other Topics Concern    Not on file     Social History Narrative    No narrative on file       REVIEW OF SYSTEMS:  Constitution: Negative. Negative for chills, fever and night sweats.   Cardiovascular: Negative for chest pain and syncope.   Respiratory: Negative for cough and shortness of breath.   Gastrointestinal: See HPI. Negative for nausea/vomiting. Negative for abdominal pain.  Genitourinary: See HPI. Negative for discoloration or dysuria.  Skin: Negative for dry skin, itching and rash.   Hematologic/Lymphatic: Negative for bleeding problem. Does not bruise/bleed easily.   Musculoskeletal: Negative for falls and muscle weakness.   Neurological: See HPI. No seizures.   Endocrine: Negative for polydipsia, polyphagia and polyuria.   Allergic/Immunologic: Negative for hives and persistent infections.     EXAM:  Ht 5' 6" (1.676 m)   Wt 100.3 kg (221 lb 1.9 oz)   BMI 35.69 kg/m²     General: The patient is a very pleasant 62 " y.o. male in no apparent distress, the patient is oriented to person, place and time.  Psych: Normal mood and affect  HEENT: Vision grossly intact, hearing intact to the spoken word.  Lungs: Respirations unlabored.  Gait: Antalgic station and gait, unable to perform toe or heel walk.   Skin: Dorsal lumbar skin negative for rashes, lesions, hairy patches and surgical scars. There is mild lumbar tenderness to palpation.  Range of motion: Lumbar range of motion is acceptable.  Spinal Balance: Global saggital and coronal spinal balance acceptable, not significant for scoliosis and kyphosis.  Musculoskeletal: No pain with the range of motion of the bilateral hips. No trochanteric tenderness to palpation.  Vascular: Bilateral lower extremities warm and well perfused, dorsalis pedis pulses 2+ bilaterally.  Neurological: Normal strength and tone in all major motor groups in the bilateral lower extremities. Normal sensation to light touch in the L2-S1 dermatomes bilaterally with the exception of decreased sensation in the L5 and S1 dermatomes on the left.  Deep tendon reflexes symmetric 2+ in the bilateral lower extremities.  Negative Babinski bilaterally. Straight leg raise positive on the left, negative on the right.    IMAGING:      Today I personally reviewed AP, Lat and Flex/Ex  upright L-spine films that demonstrate significant L5/S1 disc space narrowing.  There is grade I anterolisthesis of L4/5.        ASSESSMENT/PLAN:    Diagnoses and all orders for this visit:    DDD (degenerative disc disease), lumbar  -     MRI Lumbar Spine Without Contrast; Future    Lumbar radiculopathy  -     MRI Lumbar Spine Without Contrast; Future    Spondylolisthesis, unspecified spinal region  -     MRI Lumbar Spine Without Contrast; Future      Plan for MRI lumbar spine.  Follow up after the MRI to discuss results and further treatment including ESIs and possibly surgery.       Return if symptoms worsen or fail to improve.

## 2017-11-27 NOTE — LETTER
November 27, 2017      ANDREW Sheridan MD  [address] [facility] [place] [address]           [facility]  [address]  [place] [address]  Phone: [phone]          Patient: Madhu Grant   MR Number: [id number]   YOB: 1955   Date of Visit: 11/27/2017       Dear Dr. ANDREW Sheridan:    Thank you for referring Madhu Grant to me for evaluation. Attached you will find relevant portions of my assessment and plan of care.    If you have questions, please do not hesitate to call me. I look forward to following Madhu Grant along with you.    Sincerely,    Sindi Maravilla PA-C    Enclosure  CC:  No Recipients    If you would like to receive this communication electronically, please contact [email or web address] or [phone] to request more information on [facility] Link access.    For providers and/or their staff who would like to refer a patient to [facility], please contact us through our one-stop-shop provider referral line, Samantha Gee, at [phone].    If you feel you have received this communication in error or would no longer like to receive these types of communications, please e-mail [email or web address]

## 2017-11-29 RX ORDER — HYDROCODONE BITARTRATE AND ACETAMINOPHEN 10; 325 MG/1; MG/1
TABLET ORAL
Qty: 90 TABLET | Refills: 0 | Status: SHIPPED | OUTPATIENT
Start: 2017-11-29 | End: 2018-01-02 | Stop reason: SDUPTHER

## 2017-11-29 NOTE — TELEPHONE ENCOUNTER
----- Message from Noemí Lopez sent at 11/29/2017  9:35 AM CST -----  Contact: Self [phone]  Pt would like a refill on hydrocodone-acetaminophen 10-325mg ,please call

## 2017-12-08 ENCOUNTER — HOSPITAL ENCOUNTER (OUTPATIENT)
Dept: RADIOLOGY | Facility: HOSPITAL | Age: 62
Discharge: HOME | End: 2017-12-08
Attending: PHYSICIAN ASSISTANT
Payer: COMMERCIAL

## 2017-12-08 DIAGNOSIS — M54.16 LUMBAR RADICULOPATHY: ICD-10-CM

## 2017-12-08 DIAGNOSIS — M51.36 DDD (DEGENERATIVE DISC DISEASE), LUMBAR: ICD-10-CM

## 2017-12-08 DIAGNOSIS — M43.10 SPONDYLOLISTHESIS, UNSPECIFIED SPINAL REGION: ICD-10-CM

## 2017-12-08 PROCEDURE — 72148 MRI LUMBAR SPINE W/O DYE: CPT | Mod: TC

## 2017-12-08 PROCEDURE — 72148 MRI LUMBAR SPINE W/O DYE: CPT | Mod: 26,,, | Performed by: RADIOLOGY

## 2017-12-11 ENCOUNTER — OFFICE VISIT (OUTPATIENT)
Dept: ORTHOPEDICS | Facility: CLINIC | Age: 62
End: 2017-12-11
Payer: COMMERCIAL

## 2017-12-11 VITALS — HEIGHT: 66 IN | BODY MASS INDEX: 35.54 KG/M2 | WEIGHT: 221.13 LBS

## 2017-12-11 DIAGNOSIS — M43.10 SPONDYLOLISTHESIS, UNSPECIFIED SPINAL REGION: ICD-10-CM

## 2017-12-11 DIAGNOSIS — M54.16 LUMBAR RADICULOPATHY: ICD-10-CM

## 2017-12-11 DIAGNOSIS — M51.36 DDD (DEGENERATIVE DISC DISEASE), LUMBAR: Primary | ICD-10-CM

## 2017-12-11 PROCEDURE — 99999 PR PBB SHADOW E&M-EST. PATIENT-LVL III: CPT | Mod: PBBFAC,,, | Performed by: PHYSICIAN ASSISTANT

## 2017-12-11 PROCEDURE — 99213 OFFICE O/P EST LOW 20 MIN: CPT | Mod: S$GLB,,, | Performed by: PHYSICIAN ASSISTANT

## 2017-12-11 RX ORDER — MELOXICAM 15 MG/1
15 TABLET ORAL DAILY
Qty: 30 TABLET | Refills: 0 | Status: SHIPPED | OUTPATIENT
Start: 2017-12-11 | End: 2018-01-10

## 2017-12-11 NOTE — PROGRESS NOTES
"DATE: 12/11/2017  PATIENT: Madhu Grant    Attending Physician: Jorge Cohen M.D.    HISTORY:  Madhu Grant is a 62 y.o. male who returns to me today for MRI results.  He was last seen by me 11/27/2017.  Today he is doing well but notes his back is bothering him today.    The Patient denies myelopathic symptoms such as handwriting changes or difficulty with buttons/coins/keys. Denies perineal paresthesias, bowel/bladder dysfunction.    PMH/PSH/FamHx/SocHx:  Unchanged from prior visit    ROS:  REVIEW OF SYSTEMS:  Constitution: Negative. Negative for chills, fever and night sweats.   HENT: Negative for congestion and headaches.    Eyes: Negative for blurred vision, left vision loss and right vision loss.   Cardiovascular: Negative for chest pain and syncope.   Respiratory: Negative for cough and shortness of breath.    Endocrine: Negative for polydipsia, polyphagia and polyuria.   Hematologic/Lymphatic: Negative for bleeding problem. Does not bruise/bleed easily.   Skin: Negative for dry skin, itching and rash.   Musculoskeletal: Negative for falls and muscle weakness.   Gastrointestinal: Negative for abdominal pain and bowel incontinence.   Allergic/Immunologic: Negative for hives and persistent infections.  Genitourinary: Negative for urinary retention/incontinence and nocturia.   Neurological: Negative for disturbances in coordination, no myelopathic symptoms such as handwriting changes or difficulty with buttons, coins, keys or small objects. No loss of balance and seizures.   Psychiatric/Behavioral: Negative for depression. The patient does not have insomnia.   Denies perineal paresthesias, bowel or bladder incontinence    EXAM:  Ht 5' 6" (1.676 m)   Wt 100.3 kg (221 lb 1.9 oz)   BMI 35.69 kg/m²     My physical examination was notable for the following findings:     Antalgic station and gait, unable to perform toe or heel walk.   Dorsal lumbar skin negative for rashes, lesions, hairy patches and surgical " scars. There is mild lumbar tenderness to palpation.  Lumbar range of motion is acceptable.  Global saggital and coronal spinal balance acceptable, not significant for scoliosis and kyphosis.  No pain with the range of motion of the bilateral hips. No trochanteric tenderness to palpation.  Bilateral lower extremities warm and well perfused, dorsalis pedis pulses 2+ bilaterally.  Normal strength and tone in all major motor groups in the bilateral lower extremities. Normal sensation to light touch in the L2-S1 dermatomes bilaterally with the exception of decreased sensation in the L5 and S1 dermatomes on the left.  Deep tendon reflexes symmetric 2+ in the bilateral lower extremities.  Negative Babinski bilaterally. Straight leg raise positive on the left, negative on the right.      IMAGING:  No new imaging today.    Today I personally re- reviewed AP, Lat and Flex/Ex  upright L-spine that demonstrate significant L5/S1 disc space narrowing.  There is grade I anterolisthesis of L4/5.      MRI lumbar spine demonstrates multilevel degenerative changes with mild stenosis at L4/5 and bilateral facet arthropathy.        ASSESSMENT/PLAN:    Diagnoses and all orders for this visit:    DDD (degenerative disc disease), lumbar    Lumbar radiculopathy    Spondylolisthesis, unspecified spinal region    Other orders  -     meloxicam (MOBIC) 15 MG tablet; Take 1 tablet (15 mg total) by mouth once daily.    Discussed with Dr. Cohen. There is no surgical intervention indicated.  The patient is not interested in ESIs or PT.  Follow up with any new or worsening symptoms.         Return if symptoms worsen or fail to improve.

## 2018-01-02 RX ORDER — HYDROCODONE BITARTRATE AND ACETAMINOPHEN 10; 325 MG/1; MG/1
TABLET ORAL
Qty: 90 TABLET | Refills: 0 | Status: SHIPPED | OUTPATIENT
Start: 2018-01-02 | End: 2018-01-31 | Stop reason: SDUPTHER

## 2018-01-02 NOTE — TELEPHONE ENCOUNTER
----- Message from Mikayla Melissa sent at 1/2/2018  8:25 AM CST -----  Contact: pt [phone]  RX request - refill or new RX.  Is this a refill or new RX:  refill  RX name and strength: hydrocodone-acetaminophen 10-325mg (NORCO)  mg Tab  Directions:   Is this a 30 day or 90 day RX:      Comments:  Pt said can he please come pick this up today.

## 2018-01-03 ENCOUNTER — TELEPHONE (OUTPATIENT)
Dept: INTERNAL MEDICINE | Facility: CLINIC | Age: 63
End: 2018-01-03

## 2018-01-03 NOTE — TELEPHONE ENCOUNTER
Pt was advised on 1/2/18 evening that his rx was ready for .    Verbalized understanding and he was able to  this morning (1/3/18).

## 2018-01-03 NOTE — TELEPHONE ENCOUNTER
----- Message from Brandyn Lugo sent at 1/2/2018  4:03 PM CST -----  Contact: self [phone]  RX request - refill or new RX.  Is this a refill or new RX:  Refill   RX name and strength: hydrocodone-acetaminophen 10-325mg (NORCO)  mg Tab  Directions:  TAKE ONE TABLET BY MOUTH EVERY 4 TO 6 HOURS AS NEEDED FOR PAIN  Is this a 30 day or 90 day RX:  30  Pt request to have refill today asap , state he is completely out   Comments:

## 2018-01-31 RX ORDER — HYDROCODONE BITARTRATE AND ACETAMINOPHEN 10; 325 MG/1; MG/1
TABLET ORAL
Qty: 90 TABLET | Refills: 0 | Status: SHIPPED | OUTPATIENT
Start: 2018-01-31 | End: 2018-02-28 | Stop reason: SDUPTHER

## 2018-01-31 NOTE — TELEPHONE ENCOUNTER
----- Message from Noemí Lopez sent at 1/30/2018  4:14 PM CST -----  Contact: Self [phone]  RX request - refill or new RX.  Is this a refill or new RX:  Refill  RX name and strength: hydrocodone-acetaminophen 10-325mg   Directions:   Is this a 30 day or 90 day RX:    Pharmacy name and phone #    Comments:

## 2018-02-28 RX ORDER — HYDROCODONE BITARTRATE AND ACETAMINOPHEN 10; 325 MG/1; MG/1
TABLET ORAL
Qty: 90 TABLET | Refills: 0 | Status: SHIPPED | OUTPATIENT
Start: 2018-02-28 | End: 2018-03-28 | Stop reason: SDUPTHER

## 2018-02-28 NOTE — TELEPHONE ENCOUNTER
"----- Message from Debby Barros sent at 2/28/2018  7:47 AM CST -----  Contact: Self/[phone]  RX request - refill or new RX.  Is this a refill or new RX:  refill  RX name and strength: hydrocodone-acetaminophen 10-325mg (NORCO)  mg Tab  Directions: TAKE ONE TABLET BY MOUTH EVERY 4 TO 6 HOURS AS NEEDED FOR PAIN  Is this a 30 day or 90 day RX:    Pharmacy name and phone # (DON'T enter "on file" or "in chart"): pt picks up script  Comments:        "

## 2018-03-26 RX ORDER — GABAPENTIN 300 MG/1
CAPSULE ORAL
Qty: 180 CAPSULE | Refills: 6 | Status: ON HOLD | OUTPATIENT
Start: 2018-03-26 | End: 2018-10-16

## 2018-03-26 RX ORDER — CYCLOBENZAPRINE HCL 10 MG
TABLET ORAL
Qty: 90 TABLET | Refills: 0 | Status: SHIPPED | OUTPATIENT
Start: 2018-03-26 | End: 2018-05-09 | Stop reason: SDUPTHER

## 2018-03-28 RX ORDER — HYDROCODONE BITARTRATE AND ACETAMINOPHEN 10; 325 MG/1; MG/1
TABLET ORAL
Qty: 90 TABLET | Refills: 0 | Status: SHIPPED | OUTPATIENT
Start: 2018-03-28 | End: 2018-04-25 | Stop reason: SDUPTHER

## 2018-03-28 NOTE — TELEPHONE ENCOUNTER
----- Message from Olamide Llamas sent at 3/28/2018  8:28 AM CDT -----  Contact: Patient [phone]  Rx Name:hydrocodone-acetaminophen 10-325mg (NORCO)  mg Tab    Refill: yes    Pharmacy:    Comments:    Please call and advise.    Thank You

## 2018-04-25 RX ORDER — HYDROCODONE BITARTRATE AND ACETAMINOPHEN 10; 325 MG/1; MG/1
TABLET ORAL
Qty: 90 TABLET | Refills: 0 | Status: SHIPPED | OUTPATIENT
Start: 2018-04-25 | End: 2018-05-29 | Stop reason: SDUPTHER

## 2018-04-25 NOTE — TELEPHONE ENCOUNTER
"----- Message from Mari Dawkins sent at 4/25/2018 12:38 PM CDT -----  Contact: self/ [phone]  RX request - refill or new RX.  Is this a refill or new RX:    RX name and strength: hydrocodone-acetaminophen 10-325mg (NORCO)  mg Tab 90 tablet   Directions:   Is this a 30 day or 90 day RX:    Pharmacy name and phone # (DON'T enter "on file" or "in chart"):   Comments:        "

## 2018-04-27 RX ORDER — PRAVASTATIN SODIUM 40 MG/1
TABLET ORAL
Qty: 30 TABLET | Refills: 6 | Status: ON HOLD | OUTPATIENT
Start: 2018-04-27 | End: 2018-10-18 | Stop reason: HOSPADM

## 2018-04-30 ENCOUNTER — TELEPHONE (OUTPATIENT)
Dept: INTERNAL MEDICINE | Facility: CLINIC | Age: 63
End: 2018-04-30

## 2018-04-30 NOTE — TELEPHONE ENCOUNTER
----- Message from Angela Singh sent at 4/30/2018  2:22 PM CDT -----  Contact: Behnaz with  [facility]   [phone]  Pharmacy is calling to clarify an RX.  RX name:  Norco  What do they need to clarify:  Need diagnoses code    Comments: patient has been getting it for to long. Left message earlier.

## 2018-04-30 NOTE — TELEPHONE ENCOUNTER
----- Message from Brandyn Lugo sent at 4/28/2018  9:17 AM CDT -----  Contact: [facility]   Pharmacy is calling to clarify an RX.  RX name:  hydrocodone-acetaminophen 10-325mg (NORCO)  mg Tab  What do they need to clarify:  Need diagnosis code   Comments:

## 2018-05-01 ENCOUNTER — TELEPHONE (OUTPATIENT)
Dept: INTERNAL MEDICINE | Facility: CLINIC | Age: 63
End: 2018-05-01

## 2018-05-10 RX ORDER — CYCLOBENZAPRINE HCL 10 MG
TABLET ORAL
Qty: 90 TABLET | Refills: 2 | Status: ON HOLD | OUTPATIENT
Start: 2018-05-10 | End: 2018-10-16

## 2018-05-24 RX ORDER — LISINOPRIL 20 MG/1
TABLET ORAL
Qty: 30 TABLET | Refills: 6 | Status: SHIPPED | OUTPATIENT
Start: 2018-05-24

## 2018-05-29 RX ORDER — HYDROCODONE BITARTRATE AND ACETAMINOPHEN 10; 325 MG/1; MG/1
TABLET ORAL
Qty: 90 TABLET | Refills: 0 | Status: SHIPPED | OUTPATIENT
Start: 2018-05-29 | End: 2018-06-26 | Stop reason: SDUPTHER

## 2018-05-29 NOTE — TELEPHONE ENCOUNTER
Spoke with pt to advise that his rx is ready for .    Verbalized understanding and of office times.

## 2018-05-29 NOTE — TELEPHONE ENCOUNTER
"----- Message from Mechelle Nelson sent at 5/29/2018  8:23 AM CDT -----  Contact: self/[phone]  RX request - refill or new RX.  Is this a refill or new RX:  refill  RX name and strength: hydrocodone-acetaminophen 10-325mg (NORCO)  mg Tab  Directions:   Is this a 30 day or 90 day RX:    Local pharmacy or mail order pharmacy:    Pharmacy name and phone # (DON'T enter "on file" or "in chart"):   Comments:        "

## 2018-06-05 ENCOUNTER — LAB VISIT (OUTPATIENT)
Dept: LAB | Facility: HOSPITAL | Age: 63
End: 2018-06-05
Attending: INTERNAL MEDICINE
Payer: COMMERCIAL

## 2018-06-05 ENCOUNTER — OFFICE VISIT (OUTPATIENT)
Dept: INTERNAL MEDICINE | Facility: CLINIC | Age: 63
End: 2018-06-05
Payer: COMMERCIAL

## 2018-06-05 VITALS
DIASTOLIC BLOOD PRESSURE: 68 MMHG | RESPIRATION RATE: 16 BRPM | SYSTOLIC BLOOD PRESSURE: 110 MMHG | HEIGHT: 65 IN | HEART RATE: 103 BPM | OXYGEN SATURATION: 96 % | BODY MASS INDEX: 32.58 KG/M2 | TEMPERATURE: 98 F | WEIGHT: 195.56 LBS

## 2018-06-05 DIAGNOSIS — E11.59 HYPERTENSION ASSOCIATED WITH DIABETES: ICD-10-CM

## 2018-06-05 DIAGNOSIS — F11.90 CHRONIC, CONTINUOUS USE OF OPIOIDS: ICD-10-CM

## 2018-06-05 DIAGNOSIS — I15.2 HYPERTENSION ASSOCIATED WITH DIABETES: ICD-10-CM

## 2018-06-05 DIAGNOSIS — G89.29 OTHER CHRONIC PAIN: ICD-10-CM

## 2018-06-05 DIAGNOSIS — E11.51 DIABETES MELLITUS WITH PERIPHERAL CIRCULATORY DISORDER: ICD-10-CM

## 2018-06-05 DIAGNOSIS — E11.51 DIABETES MELLITUS WITH PERIPHERAL CIRCULATORY DISORDER: Primary | ICD-10-CM

## 2018-06-05 LAB
ALBUMIN SERPL BCP-MCNC: 2.8 G/DL
ALP SERPL-CCNC: 348 U/L
ALT SERPL W/O P-5'-P-CCNC: 39 U/L
ANION GAP SERPL CALC-SCNC: 10 MMOL/L
AST SERPL-CCNC: 126 U/L
BILIRUB SERPL-MCNC: 1.4 MG/DL
BUN SERPL-MCNC: 13 MG/DL
CALCIUM SERPL-MCNC: 9.2 MG/DL
CHLORIDE SERPL-SCNC: 96 MMOL/L
CO2 SERPL-SCNC: 24 MMOL/L
CREAT SERPL-MCNC: 0.9 MG/DL
EST. GFR  (AFRICAN AMERICAN): >60 ML/MIN/1.73 M^2
EST. GFR  (NON AFRICAN AMERICAN): >60 ML/MIN/1.73 M^2
ESTIMATED AVG GLUCOSE: 126 MG/DL
GLUCOSE SERPL-MCNC: 106 MG/DL
HBA1C MFR BLD HPLC: 6 %
POTASSIUM SERPL-SCNC: 5.1 MMOL/L
PROT SERPL-MCNC: 7.8 G/DL
SODIUM SERPL-SCNC: 130 MMOL/L

## 2018-06-05 PROCEDURE — 3078F DIAST BP <80 MM HG: CPT | Mod: CPTII,S$GLB,, | Performed by: INTERNAL MEDICINE

## 2018-06-05 PROCEDURE — 83036 HEMOGLOBIN GLYCOSYLATED A1C: CPT

## 2018-06-05 PROCEDURE — 3074F SYST BP LT 130 MM HG: CPT | Mod: CPTII,S$GLB,, | Performed by: INTERNAL MEDICINE

## 2018-06-05 PROCEDURE — 3008F BODY MASS INDEX DOCD: CPT | Mod: CPTII,S$GLB,, | Performed by: INTERNAL MEDICINE

## 2018-06-05 PROCEDURE — 99999 PR PBB SHADOW E&M-EST. PATIENT-LVL III: CPT | Mod: PBBFAC,,, | Performed by: INTERNAL MEDICINE

## 2018-06-05 PROCEDURE — 80053 COMPREHEN METABOLIC PANEL: CPT

## 2018-06-05 PROCEDURE — 99214 OFFICE O/P EST MOD 30 MIN: CPT | Mod: S$GLB,,, | Performed by: INTERNAL MEDICINE

## 2018-06-05 PROCEDURE — 3044F HG A1C LEVEL LT 7.0%: CPT | Mod: CPTII,S$GLB,, | Performed by: INTERNAL MEDICINE

## 2018-06-05 PROCEDURE — 36415 COLL VENOUS BLD VENIPUNCTURE: CPT | Mod: PO

## 2018-06-12 NOTE — PROGRESS NOTES
History of present illness:  63-year-old male in today following up on several issues.  Three-month pharmacologic opioid visit for his chronic musculoskeletal pain lumbar radiculopathy.  Taking his hydrocodone preparation as directed with no changes pain levels remain moderate.  Not interested in any significant interventional therapies at this time.  Patient also has hypertension diabetes mellitus PA D tobacco use disorder.  He is diabetes is diet controlled.  Currently reports no chest pain palpitations syncope no definite claudication.  No symptoms of hyperglycemia or hypoglycemia.  Takes medications as directed.     Current medications:  All medications are noted reviewed the electronic medical record medication list.    Review of systems:  Constitutional:  No fever no chills  Respiratory:  No change in baseline cough status no overt shortness of breath other than decreased exertional capacity  Cardiovascular:  No chest pain palpitations syncope GI:  No abdominal pain nausea vomiting bowel incontinence :  No urinary cons    Health screenings:  The declines colonoscopy due to co-payment  Declines low-dose CT scan chest due to the co-payment  Declines pneumococcal vaccines    Physical examination:  General alert appropriately groomed male no acute distress.  Vital signs:  Blood pressure 110/68 with.  Other vital signs noted readings reviewed is normal.  HEENT:  Neck supple no masses no thyromegaly.  Lungs:  Clear to auscultation  Cardiovascular:  Regular rate and rhythm. 1/6 systolic murmur.  Carotids full bilaterally bruits.  Pedal pulses diminished no overt ischemic changes of the lower extremities    Impression:  Diet-controlled diabetes mellitus is due for update.  Hypertension controlled.  Dyslipidemia statin therapy.  Chronic musculoskeletal pain and lumbar radiculopathy  Chronic opioid use related to above stable    Plan:  Update metabolic profile glycohemoglobin urinalysis  Discussed FIT testing if not  willing to pursue colonoscopy.  He will consider.  Declines pneumococcal vaccines.  The patient did sign a pain contract with us today.  Return to clinic in 3 months.

## 2018-06-18 ENCOUNTER — LAB VISIT (OUTPATIENT)
Dept: LAB | Facility: HOSPITAL | Age: 63
End: 2018-06-18
Attending: INTERNAL MEDICINE
Payer: COMMERCIAL

## 2018-06-18 DIAGNOSIS — Z12.11 SCREEN FOR COLON CANCER: ICD-10-CM

## 2018-06-18 PROCEDURE — 82274 ASSAY TEST FOR BLOOD FECAL: CPT

## 2018-06-19 LAB — HEMOCCULT STL QL IA: POSITIVE

## 2018-06-26 NOTE — TELEPHONE ENCOUNTER
----- Message from Megha Benedict sent at 6/26/2018  8:28 AM CDT -----  Contact: Pt Cell # [phone]  Patient would like to come in to  a written script for HYDROcodone-acetaminophen (NORCO)  mg per tablet.

## 2018-06-28 RX ORDER — HYDROCODONE BITARTRATE AND ACETAMINOPHEN 10; 325 MG/1; MG/1
TABLET ORAL
Qty: 90 TABLET | Refills: 0 | Status: SHIPPED | OUTPATIENT
Start: 2018-06-28 | End: 2018-07-25 | Stop reason: SDUPTHER

## 2018-07-17 ENCOUNTER — TELEPHONE (OUTPATIENT)
Dept: INTERNAL MEDICINE | Facility: CLINIC | Age: 63
End: 2018-07-17

## 2018-07-17 NOTE — TELEPHONE ENCOUNTER
Advise pt the recent stool test was positive for colon blood. As before advise C-scope for evaluation

## 2018-07-18 NOTE — TELEPHONE ENCOUNTER
Spoke with pt to advise of stool results and MD recommendations for C-scope.    Verbalized understanding but declines C-scope and states that he believes the bleeding to be due to an ulcer in his stomach.

## 2018-07-25 RX ORDER — HYDROCODONE BITARTRATE AND ACETAMINOPHEN 10; 325 MG/1; MG/1
TABLET ORAL
Qty: 90 TABLET | Refills: 0 | Status: SHIPPED | OUTPATIENT
Start: 2018-07-25 | End: 2018-08-22 | Stop reason: SDUPTHER

## 2018-07-25 NOTE — TELEPHONE ENCOUNTER
----- Message from Carlos Manuel Banks sent at 7/25/2018  8:35 AM CDT -----  Contact: Patient [phone]  Is this a refill or new RX:  Refill    RX name and strength: HYDROcodone-acetaminophen (NORCO)  mg per tablet

## 2018-08-15 ENCOUNTER — TELEPHONE (OUTPATIENT)
Dept: INTERNAL MEDICINE | Facility: CLINIC | Age: 63
End: 2018-08-15

## 2018-08-15 NOTE — TELEPHONE ENCOUNTER
We have advised of positive stool test for blood.  Advise that this test--FIT--is specific for colon blood.  Again rec having c-scope

## 2018-08-17 NOTE — TELEPHONE ENCOUNTER
Spoke with pt to advise of FIT results and MD recommendations for colonoscopy.    Verbalized understanding but declines colonoscopy.

## 2018-08-22 RX ORDER — HYDROCODONE BITARTRATE AND ACETAMINOPHEN 10; 325 MG/1; MG/1
TABLET ORAL
Qty: 90 TABLET | Refills: 0 | Status: SHIPPED | OUTPATIENT
Start: 2018-08-22 | End: 2018-09-25 | Stop reason: SDUPTHER

## 2018-08-22 NOTE — TELEPHONE ENCOUNTER
----- Message from Carlos Manuel Banks sent at 8/22/2018  2:07 PM CDT -----  Contact: Patient [phone]  Is this a refill or new RX:  Refill    RX name and strength: HYDROcodone-acetaminophen (NORCO)  mg per tablet    Comments:

## 2018-09-25 RX ORDER — HYDROCODONE BITARTRATE AND ACETAMINOPHEN 10; 325 MG/1; MG/1
TABLET ORAL
Qty: 90 TABLET | Refills: 0 | Status: SHIPPED | OUTPATIENT
Start: 2018-09-25

## 2018-09-25 NOTE — TELEPHONE ENCOUNTER
"----- Message from Barry oGnzalez sent at 9/25/2018  4:17 PM CDT -----  Contact: Self -[phone]  RX request - refill or new RX.  Is this a refill or new RX:  refill  RX name and strength: HYDROcodone-acetaminophen (NORCO)  mg per tablet  Directions:   Is this a 30 day or 90 day RX:  30 day   Local pharmacy or mail order pharmacy:    Pharmacy name and phone # (DON'T enter "on file" or "in chart"):   Comments:  Pt states he is on his last pill.        "

## 2018-10-15 ENCOUNTER — HOSPITAL ENCOUNTER (INPATIENT)
Facility: HOSPITAL | Age: 63
LOS: 3 days | Discharge: HOME HEALTH | DRG: 872 | End: 2018-10-18
Attending: EMERGENCY MEDICINE | Admitting: EMERGENCY MEDICINE
Payer: COMMERCIAL

## 2018-10-15 ENCOUNTER — OFFICE VISIT (OUTPATIENT)
Dept: INTERNAL MEDICINE | Facility: CLINIC | Age: 63
End: 2018-10-15
Payer: COMMERCIAL

## 2018-10-15 VITALS — BODY MASS INDEX: 28.14 KG/M2 | HEART RATE: 84 BPM | WEIGHT: 168.88 LBS | TEMPERATURE: 98 F | HEIGHT: 65 IN

## 2018-10-15 DIAGNOSIS — E83.42 HYPOMAGNESEMIA: ICD-10-CM

## 2018-10-15 DIAGNOSIS — R16.0 HEPATOMEGALY: ICD-10-CM

## 2018-10-15 DIAGNOSIS — A41.9 SEPSIS, DUE TO UNSPECIFIED ORGANISM: ICD-10-CM

## 2018-10-15 DIAGNOSIS — E80.6 HYPERBILIRUBINEMIA: ICD-10-CM

## 2018-10-15 DIAGNOSIS — R53.83 MALAISE AND FATIGUE: ICD-10-CM

## 2018-10-15 DIAGNOSIS — E87.5 HYPERKALEMIA: ICD-10-CM

## 2018-10-15 DIAGNOSIS — R18.8 OTHER ASCITES: ICD-10-CM

## 2018-10-15 DIAGNOSIS — I95.9 HYPOTENSION, UNSPECIFIED HYPOTENSION TYPE: ICD-10-CM

## 2018-10-15 DIAGNOSIS — E11.42 TYPE 2 DIABETES MELLITUS WITH PERIPHERAL NEUROPATHY: ICD-10-CM

## 2018-10-15 DIAGNOSIS — R91.8 PULMONARY NODULES/LESIONS, MULTIPLE: ICD-10-CM

## 2018-10-15 DIAGNOSIS — R17 JAUNDICE: ICD-10-CM

## 2018-10-15 DIAGNOSIS — N17.9 AKI (ACUTE KIDNEY INJURY): ICD-10-CM

## 2018-10-15 DIAGNOSIS — I95.9 HYPOTENSION, UNSPECIFIED HYPOTENSION TYPE: Primary | ICD-10-CM

## 2018-10-15 DIAGNOSIS — R53.81 MALAISE AND FATIGUE: ICD-10-CM

## 2018-10-15 DIAGNOSIS — R63.4 WEIGHT LOSS, UNINTENTIONAL: ICD-10-CM

## 2018-10-15 DIAGNOSIS — K74.60 HEPATIC CIRRHOSIS, UNSPECIFIED HEPATIC CIRRHOSIS TYPE, UNSPECIFIED WHETHER ASCITES PRESENT: ICD-10-CM

## 2018-10-15 DIAGNOSIS — D72.829 LEUKOCYTOSIS, UNSPECIFIED TYPE: ICD-10-CM

## 2018-10-15 DIAGNOSIS — R00.0 TACHYCARDIA: ICD-10-CM

## 2018-10-15 DIAGNOSIS — S22.080A T12 COMPRESSION FRACTURE: ICD-10-CM

## 2018-10-15 DIAGNOSIS — R18.0 MALIGNANT ASCITES: Primary | ICD-10-CM

## 2018-10-15 DIAGNOSIS — R74.01 TRANSAMINITIS: ICD-10-CM

## 2018-10-15 DIAGNOSIS — K63.89 MASS OF HEPATIC FLEXURE OF COLON: ICD-10-CM

## 2018-10-15 DIAGNOSIS — R79.89 ELEVATED LACTIC ACID LEVEL: ICD-10-CM

## 2018-10-15 PROBLEM — G44.209 TENSION TYPE HEADACHE: Status: ACTIVE | Noted: 2018-10-15

## 2018-10-15 PROBLEM — G89.29 CHRONIC BACK PAIN: Status: ACTIVE | Noted: 2018-10-15

## 2018-10-15 PROBLEM — M54.9 CHRONIC BACK PAIN: Status: ACTIVE | Noted: 2018-10-15

## 2018-10-15 PROBLEM — J92.9 PLEURAL PLAQUE: Status: ACTIVE | Noted: 2018-10-15

## 2018-10-15 LAB
AFP SERPL-MCNC: 0.8 NG/ML
ALBUMIN SERPL BCP-MCNC: 2 G/DL
ALLENS TEST: ABNORMAL
ALLENS TEST: ABNORMAL
ALP SERPL-CCNC: 635 U/L
ALT SERPL W/O P-5'-P-CCNC: 41 U/L
AMMONIA PLAS-SCNC: 48 UMOL/L
ANION GAP SERPL CALC-SCNC: 14 MMOL/L
APPEARANCE FLD: CLEAR
AST SERPL-CCNC: 148 U/L
BACTERIA #/AREA URNS AUTO: ABNORMAL /HPF
BASOPHILS # BLD AUTO: 0.03 K/UL
BASOPHILS NFR BLD: 0.2 %
BILIRUB SERPL-MCNC: 3.2 MG/DL
BILIRUB UR QL STRIP: NEGATIVE
BNP SERPL-MCNC: 96 PG/ML
BODY FLD TYPE: NORMAL
BUN SERPL-MCNC: 45 MG/DL
CALCIUM SERPL-MCNC: 9 MG/DL
CHLORIDE SERPL-SCNC: 99 MMOL/L
CLARITY UR REFRACT.AUTO: ABNORMAL
CO2 SERPL-SCNC: 19 MMOL/L
COLOR FLD: YELLOW
COLOR UR AUTO: ABNORMAL
CREAT SERPL-MCNC: 1.8 MG/DL
CREAT UR-MCNC: 105 MG/DL
DIFFERENTIAL METHOD: ABNORMAL
EOSINOPHIL # BLD AUTO: 0 K/UL
EOSINOPHIL NFR BLD: 0.3 %
ERYTHROCYTE [DISTWIDTH] IN BLOOD BY AUTOMATED COUNT: 20.6 %
EST. GFR  (AFRICAN AMERICAN): 45.6 ML/MIN/1.73 M^2
EST. GFR  (NON AFRICAN AMERICAN): 39.5 ML/MIN/1.73 M^2
GGT SERPL-CCNC: 329 U/L
GLUCOSE SERPL-MCNC: 94 MG/DL
GLUCOSE UR QL STRIP: NEGATIVE
GRAN CASTS UR QL COMP ASSIST: 3 /LPF
HCO3 UR-SCNC: 20.2 MMOL/L (ref 24–28)
HCO3 UR-SCNC: 21.7 MMOL/L (ref 24–28)
HCT VFR BLD AUTO: 40 %
HGB BLD-MCNC: 13.4 G/DL
HGB UR QL STRIP: NEGATIVE
HYALINE CASTS UR QL AUTO: 9 /LPF
IMM GRANULOCYTES # BLD AUTO: 0.14 K/UL
IMM GRANULOCYTES NFR BLD AUTO: 0.9 %
INR PPP: 1.5
KETONES UR QL STRIP: NEGATIVE
LACTATE SERPL-SCNC: 1.7 MMOL/L
LACTATE SERPL-SCNC: 3.7 MMOL/L
LDH SERPL L TO P-CCNC: 1.97 MMOL/L (ref 0.5–2.2)
LDH SERPL L TO P-CCNC: 3.49 MMOL/L (ref 0.5–2.2)
LEUKOCYTE ESTERASE UR QL STRIP: NEGATIVE
LIPASE SERPL-CCNC: 23 U/L
LYMPHOCYTES # BLD AUTO: 0.9 K/UL
LYMPHOCYTES NFR BLD: 5.9 %
LYMPHOCYTES NFR FLD MANUAL: 53 %
MCH RBC QN AUTO: 29.4 PG
MCHC RBC AUTO-ENTMCNC: 33.5 G/DL
MCV RBC AUTO: 88 FL
MESOTHL CELL NFR FLD MANUAL: 6 %
MICROSCOPIC COMMENT: ABNORMAL
MONOCYTES # BLD AUTO: 1.6 K/UL
MONOCYTES NFR BLD: 10.2 %
MONOS+MACROS NFR FLD MANUAL: 3 %
NEUTROPHILS # BLD AUTO: 12.6 K/UL
NEUTROPHILS NFR BLD: 82.5 %
NEUTROPHILS NFR FLD MANUAL: 38 %
NITRITE UR QL STRIP: NEGATIVE
NRBC BLD-RTO: 0 /100 WBC
PCO2 BLDA: 30.4 MMHG (ref 35–45)
PCO2 BLDA: 31.2 MMHG (ref 35–45)
PH SMN: 7.42 [PH] (ref 7.35–7.45)
PH SMN: 7.46 [PH] (ref 7.35–7.45)
PH UR STRIP: 5 [PH] (ref 5–8)
PLATELET # BLD AUTO: 219 K/UL
PMV BLD AUTO: 11.1 FL
PO2 BLDA: 48 MMHG (ref 40–60)
PO2 BLDA: 80 MMHG (ref 40–60)
POC BE: -2 MMOL/L
POC BE: -4 MMOL/L
POC SATURATED O2: 85 % (ref 95–100)
POC SATURATED O2: 97 % (ref 95–100)
POC TCO2: 21 MMOL/L (ref 24–29)
POC TCO2: 23 MMOL/L (ref 24–29)
POTASSIUM SERPL-SCNC: 5.2 MMOL/L
PROT SERPL-MCNC: 7.9 G/DL
PROT UR QL STRIP: NEGATIVE
PROTHROMBIN TIME: 14.6 SEC
RBC # BLD AUTO: 4.56 M/UL
SAMPLE: ABNORMAL
SAMPLE: ABNORMAL
SITE: ABNORMAL
SITE: ABNORMAL
SODIUM SERPL-SCNC: 132 MMOL/L
SODIUM UR-SCNC: 21 MMOL/L
SP GR UR STRIP: 1.01 (ref 1–1.03)
URN SPEC COLLECT METH UR: ABNORMAL
UROBILINOGEN UR STRIP-ACNC: NEGATIVE EU/DL
WBC # BLD AUTO: 15.21 K/UL
WBC # FLD: 246 /CU MM
WBC #/AREA URNS AUTO: 2 /HPF (ref 0–5)

## 2018-10-15 PROCEDURE — 99223 1ST HOSP IP/OBS HIGH 75: CPT | Mod: ,,, | Performed by: INTERNAL MEDICINE

## 2018-10-15 PROCEDURE — 36600 WITHDRAWAL OF ARTERIAL BLOOD: CPT

## 2018-10-15 PROCEDURE — 80074 ACUTE HEPATITIS PANEL: CPT

## 2018-10-15 PROCEDURE — 96367 TX/PROPH/DG ADDL SEQ IV INF: CPT

## 2018-10-15 PROCEDURE — 82803 BLOOD GASES ANY COMBINATION: CPT

## 2018-10-15 PROCEDURE — 99999 PR PBB SHADOW E&M-EST. PATIENT-LVL III: CPT | Mod: PBBFAC,,, | Performed by: INTERNAL MEDICINE

## 2018-10-15 PROCEDURE — 83615 LACTATE (LD) (LDH) ENZYME: CPT

## 2018-10-15 PROCEDURE — 82105 ALPHA-FETOPROTEIN SERUM: CPT

## 2018-10-15 PROCEDURE — 82042 OTHER SOURCE ALBUMIN QUAN EA: CPT

## 2018-10-15 PROCEDURE — 83880 ASSAY OF NATRIURETIC PEPTIDE: CPT

## 2018-10-15 PROCEDURE — 25000003 PHARM REV CODE 250: Performed by: PHYSICIAN ASSISTANT

## 2018-10-15 PROCEDURE — 83605 ASSAY OF LACTIC ACID: CPT

## 2018-10-15 PROCEDURE — 12000002 HC ACUTE/MED SURGE SEMI-PRIVATE ROOM

## 2018-10-15 PROCEDURE — 99285 EMERGENCY DEPT VISIT HI MDM: CPT | Mod: ,,, | Performed by: PHYSICIAN ASSISTANT

## 2018-10-15 PROCEDURE — 81001 URINALYSIS AUTO W/SCOPE: CPT

## 2018-10-15 PROCEDURE — 87040 BLOOD CULTURE FOR BACTERIA: CPT

## 2018-10-15 PROCEDURE — 80053 COMPREHEN METABOLIC PANEL: CPT

## 2018-10-15 PROCEDURE — 99214 OFFICE O/P EST MOD 30 MIN: CPT | Mod: S$GLB,,, | Performed by: INTERNAL MEDICINE

## 2018-10-15 PROCEDURE — 82570 ASSAY OF URINE CREATININE: CPT

## 2018-10-15 PROCEDURE — 96366 THER/PROPH/DIAG IV INF ADDON: CPT

## 2018-10-15 PROCEDURE — 83605 ASSAY OF LACTIC ACID: CPT | Mod: 91

## 2018-10-15 PROCEDURE — 0W9G3ZX DRAINAGE OF PERITONEAL CAVITY, PERCUTANEOUS APPROACH, DIAGNOSTIC: ICD-10-PCS | Performed by: INTERNAL MEDICINE

## 2018-10-15 PROCEDURE — 83690 ASSAY OF LIPASE: CPT

## 2018-10-15 PROCEDURE — 85025 COMPLETE CBC W/AUTO DIFF WBC: CPT

## 2018-10-15 PROCEDURE — 87070 CULTURE OTHR SPECIMN AEROBIC: CPT

## 2018-10-15 PROCEDURE — 88305 TISSUE EXAM BY PATHOLOGIST: CPT | Mod: 26,,, | Performed by: PATHOLOGY

## 2018-10-15 PROCEDURE — 63600175 PHARM REV CODE 636 W HCPCS: Performed by: INTERNAL MEDICINE

## 2018-10-15 PROCEDURE — 11000001 HC ACUTE MED/SURG PRIVATE ROOM

## 2018-10-15 PROCEDURE — 88305 TISSUE EXAM BY PATHOLOGIST: CPT | Performed by: PATHOLOGY

## 2018-10-15 PROCEDURE — 3008F BODY MASS INDEX DOCD: CPT | Mod: CPTII,S$GLB,, | Performed by: INTERNAL MEDICINE

## 2018-10-15 PROCEDURE — 96361 HYDRATE IV INFUSION ADD-ON: CPT

## 2018-10-15 PROCEDURE — 96365 THER/PROPH/DIAG IV INF INIT: CPT

## 2018-10-15 PROCEDURE — 85610 PROTHROMBIN TIME: CPT

## 2018-10-15 PROCEDURE — 84300 ASSAY OF URINE SODIUM: CPT

## 2018-10-15 PROCEDURE — 63600175 PHARM REV CODE 636 W HCPCS: Performed by: PHYSICIAN ASSISTANT

## 2018-10-15 PROCEDURE — 99285 EMERGENCY DEPT VISIT HI MDM: CPT | Mod: 25

## 2018-10-15 PROCEDURE — 87205 SMEAR GRAM STAIN: CPT

## 2018-10-15 PROCEDURE — 89051 BODY FLUID CELL COUNT: CPT

## 2018-10-15 PROCEDURE — 82977 ASSAY OF GGT: CPT

## 2018-10-15 PROCEDURE — 88112 CYTOPATH CELL ENHANCE TECH: CPT | Mod: 26,,, | Performed by: PATHOLOGY

## 2018-10-15 PROCEDURE — 25000003 PHARM REV CODE 250: Performed by: EMERGENCY MEDICINE

## 2018-10-15 PROCEDURE — 25000003 PHARM REV CODE 250: Performed by: INTERNAL MEDICINE

## 2018-10-15 PROCEDURE — 88112 CYTOPATH CELL ENHANCE TECH: CPT | Performed by: PATHOLOGY

## 2018-10-15 PROCEDURE — 87075 CULTR BACTERIA EXCEPT BLOOD: CPT

## 2018-10-15 PROCEDURE — 3044F HG A1C LEVEL LT 7.0%: CPT | Mod: CPTII,S$GLB,, | Performed by: INTERNAL MEDICINE

## 2018-10-15 PROCEDURE — 82140 ASSAY OF AMMONIA: CPT

## 2018-10-15 RX ORDER — SODIUM CHLORIDE 9 MG/ML
500 INJECTION, SOLUTION INTRAVENOUS
Status: COMPLETED | OUTPATIENT
Start: 2018-10-15 | End: 2018-10-15

## 2018-10-15 RX ORDER — ENOXAPARIN SODIUM 100 MG/ML
40 INJECTION SUBCUTANEOUS EVERY 24 HOURS
Status: DISCONTINUED | OUTPATIENT
Start: 2018-10-16 | End: 2018-10-17

## 2018-10-15 RX ORDER — ACETAMINOPHEN 325 MG/1
650 TABLET ORAL EVERY 8 HOURS PRN
Status: DISCONTINUED | OUTPATIENT
Start: 2018-10-15 | End: 2018-10-18 | Stop reason: HOSPADM

## 2018-10-15 RX ORDER — DIPHENHYDRAMINE HYDROCHLORIDE 50 MG/ML
50 INJECTION INTRAMUSCULAR; INTRAVENOUS
Status: COMPLETED | OUTPATIENT
Start: 2018-10-15 | End: 2018-10-15

## 2018-10-15 RX ORDER — LIDOCAINE HYDROCHLORIDE 10 MG/ML
INJECTION INFILTRATION; PERINEURAL
Status: DISCONTINUED
Start: 2018-10-15 | End: 2018-10-15 | Stop reason: WASHOUT

## 2018-10-15 RX ORDER — VANCOMYCIN 2 GRAM/500 ML IN 0.9 % SODIUM CHLORIDE INTRAVENOUS
2000
Status: COMPLETED | OUTPATIENT
Start: 2018-10-15 | End: 2018-10-15

## 2018-10-15 RX ORDER — ONDANSETRON 4 MG/1
8 TABLET, ORALLY DISINTEGRATING ORAL EVERY 6 HOURS PRN
Status: DISCONTINUED | OUTPATIENT
Start: 2018-10-15 | End: 2018-10-18 | Stop reason: HOSPADM

## 2018-10-15 RX ADMIN — SODIUM CHLORIDE 500 ML: 0.9 INJECTION, SOLUTION INTRAVENOUS at 11:10

## 2018-10-15 RX ADMIN — DIPHENHYDRAMINE HYDROCHLORIDE 50 MG: 50 INJECTION, SOLUTION INTRAMUSCULAR; INTRAVENOUS at 02:10

## 2018-10-15 RX ADMIN — SODIUM CHLORIDE 1000 ML: 0.9 INJECTION, SOLUTION INTRAVENOUS at 12:10

## 2018-10-15 RX ADMIN — PIPERACILLIN AND TAZOBACTAM 4.5 G: 4; .5 INJECTION, POWDER, LYOPHILIZED, FOR SOLUTION INTRAVENOUS; PARENTERAL at 12:10

## 2018-10-15 RX ADMIN — VANCOMYCIN HYDROCHLORIDE 2000 MG: 10 INJECTION, POWDER, LYOPHILIZED, FOR SOLUTION INTRAVENOUS at 12:10

## 2018-10-15 RX ADMIN — SODIUM CHLORIDE 500 ML: 0.9 INJECTION, SOLUTION INTRAVENOUS at 10:10

## 2018-10-15 RX ADMIN — PIPERACILLIN AND TAZOBACTAM 4.5 G: 4; .5 INJECTION, POWDER, LYOPHILIZED, FOR SOLUTION INTRAVENOUS; PARENTERAL at 07:10

## 2018-10-15 NOTE — PROGRESS NOTES
"Subjective:       Patient ID: Madhu Grant is a 63 y.o. male.    Chief Complaint: Anorexia; Extremity Weakness (all extremity); Headache; Sleeping Problem (sleep all day ); and Weight Loss    Patient presents accompanied by wife.  New to me with new problems.  Complains of extreme malaise/fatigue progressive over the past 2 weeks at least.  Has been having a lot of falls.  No appetite.  We can document 13# weight loss since the last visit here.  Abdomen hurts and has gotten larger.  No energy.  Sleeps "all the time."  Wife confirms this history.  She wanted to take him to the ER several days ago but patient would not go.    Denies chest pain; no shortness of breath; no orthopnea.  Has been consistent with his lisinopril.    Patient arrived in wheelchair and was examined in wheelchair because he could not stand to get on the exam table.      Review of Systems   Constitutional: Positive for activity change, appetite change, fatigue and unexpected weight change. Negative for chills and fever.   HENT: Negative.    Respiratory: Negative for cough, chest tightness, shortness of breath and wheezing.    Cardiovascular: Negative for chest pain, palpitations and leg swelling.   Gastrointestinal: Positive for abdominal distention and abdominal pain. Negative for blood in stool, constipation, diarrhea, nausea and vomiting.   Genitourinary: Negative.    Musculoskeletal: Positive for back pain.   Skin: Positive for pallor and wound. Negative for rash.   Neurological: Positive for dizziness, syncope, weakness, light-headedness and headaches.       Objective:      Physical Exam   Constitutional: He is oriented to person, place, and time. He appears well-developed and well-nourished. No distress.   Eyes: EOM are normal. Pupils are equal, round, and reactive to light. No scleral icterus.   Cardiovascular: Normal rate, regular rhythm and normal heart sounds. Exam reveals no gallop.   BP by MD = 50/30   Pulmonary/Chest: Effort normal. " No respiratory distress. He has no wheezes.   Decreased breathe sounds over the RLL.   Abdominal: Soft. Bowel sounds are normal. He exhibits distension. He exhibits no mass. There is tenderness. There is no rebound and no guarding.   Musculoskeletal: He exhibits no edema.   Not ambulated.   Neurological: He is alert and oriented to person, place, and time.   Skin: Skin is warm. There is pallor.   Multiple abrasions on both knees.   Psychiatric: He has a normal mood and affect. His behavior is normal. Judgment and thought content normal.   Vitals reviewed.      Assessment:       1. Hypotension, unspecified hypotension type    2. Weight loss, unintentional    3. Type 2 diabetes mellitus with peripheral neuropathy    4. Malaise and fatigue        Plan:   Advised patient and wife that he needs to be in the hospital for treatment of low BP and suspected anemia.  They agreed.  Patient sent to the ER.

## 2018-10-15 NOTE — CONSULTS
[facility]  Critical Care Medicine  Consult Note    Patient Name: Madhu Grant  MRN: [id number]  Admission Date: 10/15/2018  Hospital Length of Stay: 0 days  Code Status: Full Code  Attending Physician: Tl Campbell MD   Primary Care Provider: EL Sheridan MD   Principal Problem: <principal problem not specified>    Inpatient consult to Critical Care Medicine  Consult performed by: Carlos Buitrago MD  Consult ordered by: Carlos Osborne PA-C        Subjective:     HPI:  Mr. Mdahu Grant is a 63 year old male with HTN, HLD, tobacco abuse, chronic pain presents to [facility] ED from a primary care visit with hypotension. The history was provided by the patient and his wife at bedside. The patient has been physically declining for over one year with worsening balance, increasing weakness, weight loss, anorexia, increased sleep and fatigue which has become more pronounced over the last 2 months. Starting September of 2017 the patient was released from his duties at work as a  due to his frequent falls in the setting of operating heavy/expensive machinery. Since then he has suffered from the complaints listed above. More recently he states that he has noticed changes including headache, visual changes, worsening abdominal distension, dark urine and change in skin color. His wife noted that the patient sleeps nearly all day and is only able to tolerate <1 meal daily. He states that his muscle mass has decreased significantly and he can no longer sit up on his own and has difficulty ambulating more than 5-10 feet. He continues to smoke but is not awake enough to smoke as much as he did previously and is now smoking approximately 0.5-1 PPD. He has an  pack year history. He also has a history of significant alcohol intake but has reduced his intake over the last 10-15 years drinking 3-4 standard drinks daily. 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     He currently denies neck pain, sore throat, dysphagia, hemoptysis, chest pain, shortness of breath, nausea, vomiting, constipation or diarrhea. Although he has noticed that his stools have been softer than normal. Denies hematuria, dysuria or melanotic stools.    In the ED the patient was initially found to have BP of 78/45, normothermic, regular HR. His labs were remarkable for WBC 15k, Na 132, K 5.2, Bicarb 19, BUN 45/Cr 1.8, Calcium 10.6, Tbili 3.2, LA 3.7, INR 1.5, Alk phos 35, , ALT 41, Ammonium 48. CT CAP performed with evidence of pleural calcifications/plaques, bilateral pulmonary nodules, hepatomegaly with diffuse ascites, coronary and aortic vasculature with diffuse calcifications/plaques. Critical care was consulted due to LA of 3.7. Patient was then given 1.5L of NS. LA on repeat was <2.     Hospital/ICU Course:  No notes on file    Past Medical History:   Diagnosis Date    Chronic back pain     Diabetes mellitus, type 2     Hepatic steatosis     Hyperlipidemia     Hypertension     PVD (peripheral vascular disease)     Tobacco use disorder        Past Surgical History:   Procedure Laterality Date    left hand      TONSILLECTOMY      UMBILICAL HERNIA REPAIR         Review of patient's allergies indicates:   Allergen Reactions    Iodine Hives    Latex, natural rubber Hives, Itching and Edema    Venom-honey bee Hives, Itching and Edema       Family History     Problem Relation (Age of Onset)    Lung cancer Father    Stroke Brother, Paternal Grandfather        Tobacco Use    Smoking status: Current Every Day Smoker    Smokeless tobacco: Current User   Substance and Sexual Activity    Alcohol use: Yes    Drug use: No    Sexual activity: Yes      Review of Systems   Constitutional: Positive for activity change, appetite change, chills, fatigue and unexpected weight change. Negative for fever.   HENT: Negative for postnasal drip, rhinorrhea and sinus pain.    Eyes:  Positive for visual disturbance.   Respiratory: Negative for cough and shortness of breath.    Cardiovascular: Negative for chest pain, palpitations and leg swelling.   Gastrointestinal: Positive for abdominal distention. Negative for constipation, diarrhea, nausea and vomiting.   Endocrine: Negative for polydipsia, polyphagia and polyuria.   Genitourinary: Negative for decreased urine volume, dysuria, hematuria and urgency.   Musculoskeletal: Positive for arthralgias. Negative for neck pain and neck stiffness.   Skin: Negative.    Neurological: Positive for weakness and headaches.   Hematological: Negative.    Psychiatric/Behavioral: Negative.      Objective:     Vital Signs (Most Recent):  Temp: 98 °F (36.7 °C) (10/15/18 0945)  Pulse: 85 (10/15/18 1611)  Resp: 17 (10/15/18 1548)  BP: 91/62 (10/15/18 1611)  SpO2: (!) 94 % (10/15/18 1146) Vital Signs (24h Range):  Temp:  [97.6 °F (36.4 °C)-98 °F (36.7 °C)] 98 °F (36.7 °C)  Pulse:  [] 85  Resp:  [17-18] 17  SpO2:  [94 %-97 %] 94 %  BP: ()/(45-97) 91/62   Weight: 76.6 kg (168 lb 14 oz)  Body mass index is 27.46 kg/m².      Intake/Output Summary (Last 24 hours) at 10/15/2018 1710  Last data filed at 10/15/2018 1449  Gross per 24 hour   Intake 2525 ml   Output --   Net 2525 ml       Physical Exam   Constitutional: He is oriented to person, place, and time. Vital signs are normal. He appears well-developed. He appears ill. No distress.   Abdominal distension  Scleral icterus  Frail appearing, appears older than stated age   Smells of tobacco smoke in room  HENT:   Head: Normocephalic and atraumatic.   Mouth/Throat: Abnormal dentition.   edentulous   Eyes: EOM are normal. Pupils are equal, round, and reactive to light. Scleral icterus is present.   Neck: Neck supple.   Cardiovascular: Normal rate, regular rhythm, S1 normal and S2 normal.   Murmur heard.   Systolic murmur is present with a grade of 2/6.  Pulses:       Radial pulses are 2+ on the right side, and  2+ on the left side.        Dorsalis pedis pulses are 2+ on the right side, and 2+ on the left side.   Pulmonary/Chest: Effort normal. He has no wheezes. He has no rales.   Abdominal: Soft. Bowel sounds are normal. He exhibits distension and tender hepatomegaly with sharp liver edge. There is tenderness. There is no rebound. No hernia.   Musculoskeletal: He exhibits no edema or tenderness.   Lymphadenopathy:        Head (right side): No submental, no submandibular, no tonsillar, no preauricular and no posterior auricular adenopathy present.        Head (left side): No submental, no submandibular, no tonsillar, no preauricular and no posterior auricular adenopathy present.     He has no cervical adenopathy.        Right cervical: No superficial cervical adenopathy present.       Left cervical: No superficial cervical adenopathy present.   Neurological: He is alert and oriented to person, place, and time. No cranial nerve deficit. GCS eye subscore is 4. GCS verbal subscore is 5. GCS motor subscore is 6.   Skin: Skin is warm and dry. He is not diaphoretic.   Psychiatric: He has a normal mood and affect. His speech is normal.   Nursing note and vitals reviewed.      Vents:     Lines/Drains/Airways     Peripheral Intravenous Line                 Peripheral IV - Single Lumen 10/15/18 0958 Right Forearm less than 1 day         Peripheral IV - Single Lumen 10/15/18 1225 Left Antecubital less than 1 day              Significant Labs:    CBC/Anemia Profile:  Recent Labs   Lab  10/15/18   1005   WBC  15.21*   HGB  13.4*   HCT  40.0   PLT  219   MCV  88   RDW  20.6*        Chemistries:  Recent Labs   Lab  10/15/18   1005   NA  132*   K  5.2*   CL  99   CO2  19*   BUN  45*   CREATININE  1.8*   CALCIUM  9.0   ALBUMIN  2.0*   PROT  7.9   BILITOT  3.2*   ALKPHOS  635*   ALT  41   AST  148*       All pertinent labs within the past 24 hours have been reviewed.    Significant Imaging: I have reviewed all pertinent imaging  results/findings within the past 24 hours. See HPI for independent review of CT CAP prior to radiology read.     Assessment/Plan:     Cardiac/Vascular   Hypotension    - critical care consulted for persistent hypotension following 1.5L of fluid resuscitation  - this 63 year old man has concerning findings in his history with weight loss, anorexia, abdominal distension, chemical exposure, significant tobacco exposure  - CT CAP concerning for bilateral pulmonary nodules, hepatomegaly with attenuation change, gross ascites, pleural/diaphragmatic plaques  - continue fluid resuscitation, lactic acid normalized on repeat  - in current state patient is stable for hospital medicine as lactic acid normalized, BP WNL  - recommend additional fluid resuscitation, would hold any antihypertensives in the setting of ELENAOR and hypotension, hold statin in the setting of hepatic dysfunction, obtain ascitic fluid sample for analysis  - recommend continuing work up for malignancy by obtaining biopsy of pulmonary nodule and complete evaluation with CT HEAD due to worsening headaches and diplopia   - if patient has return of hypotension please feel free to consult Critical Care Medicine for additional assistance or upgrade to higher level of care              Critical care was time spent personally by me on the following activities: development of treatment plan with patient or surrogate and bedside caregivers, discussions with consultants, evaluation of patient's response to treatment, examination of patient, ordering and performing treatments and interventions, ordering and review of laboratory studies, ordering and review of radiographic studies, pulse oximetry, re-evaluation of patient's condition. This critical care time did not overlap with that of any other provider or involve time for any procedures.    Thank you for your consult. I will sign off. Please contact us if you have any additional questions.     Case discussed and  patient seen at bedside along with critical care/pulm fellows Dr. Day and Dr. Garcia.     Carlos Buitrago MD  Critical Care Medicine  [facility]

## 2018-10-15 NOTE — ASSESSMENT & PLAN NOTE
- critical care consulted for persistent hypotension following 1.5L of fluid resuscitation  - this 63 year old man has concerning findings in his history with weight loss, anorexia, abdominal distension, chemical exposure, significant tobacco exposure  - CT CAP concerning for bilateral pulmonary nodules, hepatomegaly with attenuation change, gross ascites, pleural/diaphragmatic plaques  - continue fluid resuscitation, lactic acid normalized on repeat  - in current state patient is stable for hospital medicine as lactic acid normalized, BP WNL  - recommend additional fluid resuscitation, would hold any antihypertensives in the setting of ELEANOR and hypotension, hold statin in the setting of hepatic dysfunction, obtain ascitic fluid sample for analysis  - recommend continuing work up for malignancy by obtaining biopsy of pulmonary nodule and complete evaluation with CT HEAD due to worsening headaches and diplopia   - if patient has return of hypotension please feel free to consult Critical Care Medicine for additional assistance or upgrade to higher level of care

## 2018-10-15 NOTE — HPI
64 yo male with pmh of chronic back pain, HTN, HLD, hepatic steatosis, and tobacco abuse who presented to ED from PCP for hypotension and fatigue. Blood pressure at PCP office was noted to be 50/30 for which he was urgently sent to ED.. Patient states that he has been feeling fatigued and lethargic for the past 2 weeks due which he had poor appetite and weight loss. He was also noted to be jaundiced. Patient's wife states that patient's symptoms started closer to 6 months ago and have progressed. Patient has also exhibited slowed mentation and confusion over the past 2 weeks. Patient endorses constant dull frontal headache with photophobia and notes that he work up with blurred vision this morning. He endorses nausea and had 2 episodes of vomiting yesterday. he denies any fever/chills, abdominal pain, diarrhea, dysuria rashes.     In ED, patient's BP was 78/48 with HR in low 100s. Lactic acid elevated to 3.7. He was given 1 L NS bolus with improvement in BP to 102/59. Critical care was consulted and recommended admission to floor.

## 2018-10-15 NOTE — HPI
Mr. Madhu Grant is a 63 year old male with HTN, HLD, tobacco abuse, chronic pain presents to [facility] ED from a primary care visit with hypotension. The history was provided by the patient and his wife at bedside. The patient has been physically declining for over one year with worsening balance, increasing weakness, weight loss, anorexia, increased sleep and fatigue which has become more pronounced over the last 2 months. Starting September of 2017 the patient was released from his duties at work as a  due to his frequent falls in the setting of operating heavy/expensive machinery. Since then he has suffered from the complaints listed above. More recently he states that he has noticed changes including headache, visual changes, worsening abdominal distension, dark urine and change in skin color. His wife noted that the patient sleeps nearly all day and is only able to tolerate <1 meal daily. He states that his muscle mass has decreased significantly and he can no longer sit up on his own and has difficulty ambulating more than 5-10 feet. He continues to smoke but is not awake enough to smoke as much as he did previously and is now smoking approximately 0.5-1 PPD. He has an  pack year history. 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     He currently denies neck pain, sore throat, dysphagia, hemoptysis, chest pain, shortness of breath, nausea, vomiting, constipation or diarrhea. Although he has noticed that his stools have been softer than normal. Denies hematuria, dysuria or melanotic stools.    In the ED the patient was initially found to have BP of 78/45, normothermic, regular HR. His labs were remarkable for WBC 15k, Na 132, K 5.2, Bicarb 19, BUN 45/Cr 1.8, Calcium 10.6, Tbili 3.2, LA 3.7, INR 1.5, Alk phos 35, , ALT 41, Ammonium 48. CT CAP performed with evidence of pleural calcifications/plaques, bilateral pulmonary nodules,  hepatomegaly with diffuse ascites, coronary and aortic vasculature with diffuse calcifications/plaques. Critical care was consulted due to LA of 3.7. Patient was then given 1.5L of NS. LA on repeat was <2.

## 2018-10-15 NOTE — ED NOTES
LOC: The patient is awake, alert and aware of environment with an appropriate affect, the patient is oriented x 3 and speaking appropriately.  APPEARANCE:  patient is clean and well groomed, patient's clothing is properly fastened.  SKIN: The skin is warm and dry, patient has yellowish/grey tint to skin, slightly moist mucus membranes, eyes have yellow tint  MUSCULOSKELETAL: Patient moving all extremities spontaneously, no deformities noted.  RESPIRATORY: Airway is open and patent, respirations are spontaneous, patient has a normal effort and rate, no accessory muscle use noted.  ABDOMEN: Soft and non tender to palpation, moderate distention noted  NEUROLOGIC:  facial expression is symmetrical, patient moving all extremities spontaneously, normal sensation in all extremities when touched with a finger.  Follows all commands appropriately.

## 2018-10-15 NOTE — ED PROVIDER NOTES
"Encounter Date: 10/15/2018    SCRIBE #1 NOTE: I, Kenna Kimbrough, am scribing for, and in the presence of,  Dr. Quiñones . I have scribed the following portions of the note - the EKG reading.       History     Chief Complaint   Patient presents with    Fatigue     sent from  clinic with low blood pressure     63 year old male with medical history of DM, HLD, HTN, Hepatic stenosis, PVD, Tobacco use referred to the ED from PCP's office for evaluation of hypotension (50/30 BP read in clinic today). History provided by patient and wife at bedside. Patient has been feeling fatigued for the past few weeks. Patient's wife reports he has "no energy" and has been sleeping throughout the day recently. Patient reports decreased appetite and 1 episode of emesis after eating dinner last night. He reports his abdomen is always swollen and denies any recent changes. He states he has been told he has "his uncle's body type." He reports falling frequently with the last fall occurring 2 weeks ago. He denies history of liver disease, kidney disease, or cardiac disease. He denies history of echo or stress test. He denies history of heavy ETOH consumption and has been ETOH free for 2 years. He reports drinking up to a 6 pack of beer per week when he did consume ETOH. He denies fever, chest pain, SOB, nausea, abdominal pain, dysuria, hematuria, diarrhea, constipation, melena, hematochezia. He denies blood thinner use.           Review of patient's allergies indicates:   Allergen Reactions    Iodine Hives    Latex, natural rubber Hives, Itching and Edema    Venom-honey bee Hives, Itching and Edema     Past Medical History:   Diagnosis Date    Chronic back pain     Diabetes mellitus, type 2     Hepatic steatosis     Hyperlipidemia     Hypertension     PVD (peripheral vascular disease)     Tobacco use disorder      Past Surgical History:   Procedure Laterality Date    left hand      TONSILLECTOMY      UMBILICAL HERNIA REPAIR   "     Family History   Problem Relation Age of Onset    Stroke Brother     Stroke Paternal Grandfather     Lung cancer Father      Social History     Tobacco Use    Smoking status: Current Every Day Smoker    Smokeless tobacco: Current User   Substance Use Topics    Alcohol use: Yes    Drug use: No     Review of Systems   Constitutional: Positive for fatigue. Negative for chills and fever.   HENT: Negative for congestion, sore throat and trouble swallowing.    Eyes: Negative for pain, redness and visual disturbance.   Respiratory: Negative for cough and shortness of breath.    Cardiovascular: Negative for chest pain and palpitations.   Gastrointestinal: Positive for abdominal distention and vomiting. Negative for abdominal pain, blood in stool, constipation, diarrhea and nausea.   Genitourinary: Negative for dysuria, flank pain and hematuria.   Musculoskeletal: Negative for arthralgias, neck pain and neck stiffness.   Skin: Negative for rash and wound.   Neurological: Positive for weakness, light-headedness and headaches. Negative for dizziness.     Physical Exam     Initial Vitals [10/15/18 0945]   BP Pulse Resp Temp SpO2   (!) 78/45 100 18 98 °F (36.7 °C) 97 %      MAP       --         Physical Exam    Constitutional: He appears well-developed and well-nourished. He is not diaphoretic. No distress.   Cheerful affect   HENT:   Head: Normocephalic and atraumatic.   Mouth/Throat: Oropharynx is clear and moist. No oropharyngeal exudate.   Pale mucus membranes   Eyes: EOM are normal. Pupils are equal, round, and reactive to light.   Neck: Normal range of motion. Neck supple.   Cardiovascular: Regular rhythm and intact distal pulses. Tachycardia present.    Pulmonary/Chest: No respiratory distress. He has no wheezes.   Decreased breath sounds left lower lobe   Abdominal: He exhibits distension and ascites. There is hepatomegaly.   Musculoskeletal: Normal range of motion.   Neurological: He is alert and oriented to  person, place, and time. He has normal strength. No cranial nerve deficit or sensory deficit.   Skin: Skin is warm and dry. No erythema.   Jaundiced  Superficial abrasion to bilateral knees       ED Course   Procedures  Labs Reviewed   CBC W/ AUTO DIFFERENTIAL - Abnormal; Notable for the following components:       Result Value    WBC 15.21 (*)     RBC 4.56 (*)     Hemoglobin 13.4 (*)     RDW 20.6 (*)     Immature Granulocytes 0.9 (*)     Gran # (ANC) 12.6 (*)     Immature Grans (Abs) 0.14 (*)     Lymph # 0.9 (*)     Mono # 1.6 (*)     Gran% 82.5 (*)     Lymph% 5.9 (*)     All other components within normal limits   COMPREHENSIVE METABOLIC PANEL - Abnormal; Notable for the following components:    Sodium 132 (*)     Potassium 5.2 (*)     CO2 19 (*)     BUN, Bld 45 (*)     Creatinine 1.8 (*)     Albumin 2.0 (*)     Total Bilirubin 3.2 (*)     Alkaline Phosphatase 635 (*)      (*)     eGFR if  45.6 (*)     eGFR if non  39.5 (*)     All other components within normal limits   URINALYSIS, REFLEX TO URINE CULTURE - Abnormal; Notable for the following components:    Appearance, UA Cloudy (*)     All other components within normal limits    Narrative:     Preferred Collection Type->Urine, Clean Catch  orange top cup received   PROTIME-INR - Abnormal; Notable for the following components:    Prothrombin Time 14.6 (*)     INR 1.5 (*)     All other components within normal limits   LACTIC ACID, PLASMA - Abnormal; Notable for the following components:    Lactate (Lactic Acid) 3.7 (*)     All other components within normal limits   URINALYSIS MICROSCOPIC - Abnormal; Notable for the following components:    Bacteria, UA Few (*)     Hyaline Casts, UA 9 (*)     Granular Casts, UA 3 (*)     All other components within normal limits    Narrative:     Preferred Collection Type->Urine, Clean Catch  orange top cup received   GAMMA GT - Abnormal; Notable for the following components:     (*)      All other components within normal limits   ISTAT PROCEDURE - Abnormal; Notable for the following components:    POC PCO2 31.2 (*)     POC HCO3 20.2 (*)     POC SATURATED O2 85 (*)     POC Lactate 3.49 (*)     POC TCO2 21 (*)     All other components within normal limits   ISTAT PROCEDURE - Abnormal; Notable for the following components:    POC PH 7.462 (*)     POC PCO2 30.4 (*)     POC PO2 80 (*)     POC HCO3 21.7 (*)     POC TCO2 23 (*)     All other components within normal limits   CULTURE, BLOOD   CULTURE, BLOOD   CULTURE, AEROBIC  (SPECIFY SOURCE)   CULTURE, ANAEROBIC   B-TYPE NATRIURETIC PEPTIDE   AMMONIA   LACTIC ACID, PLASMA    Narrative:     Please draw a second Lactate at 2:07   LIPASE    Narrative:     Please draw a second Lactate at 2:07   HEPATITIS PANEL, ACUTE   CREATININE, URINE, RANDOM   SODIUM, URINE, RANDOM   AFP TUMOR MARKER   CREATININE, URINE, RANDOM    Narrative:     Preferred Collection Type->Urine, Clean Catch  orange top cup received  add on [facility] #[id number] per Dr. Mannie Maloney @ 19:22  10/15/2018   add on [facility] #[id number] per Dr. Mannie Maloney @ 19:23  10/15/2018    SODIUM, URINE, RANDOM    Narrative:     Preferred Collection Type->Urine, Clean Catch  orange top cup received  add on [facility] #[id number] per Dr. Mannie Maloney @ 19:22  10/15/2018   add on [facility] #[id number] per Dr. Mannie Maloney @ 19:23  10/15/2018    WBC & DIFF,BODY FLUID   HEPATITIS PANEL, ACUTE   CYTOLOGY SPECIMEN- MEDICAL CYTOLOGY (FLUID/WASH/BRUSH)     EKG Readings: (Independently Interpreted)   Normal sinus rhythm; rate 99. No ischemic changes. No STEMI. Normal intervals        Imaging Results          US Abdomen Complete (In process)                CT Head Without Contrast (Final result)  Result time 10/15/18 23:06:54    Final result by Cuco Camp MD (10/15/18 23:06:54)                 Impression:      No acute intracranial abnormalities      Electronically signed by: Cuco Camp  MD  Date:    10/15/2018  Time:    23:06             Narrative:    EXAMINATION:  CT HEAD WITHOUT CONTRAST    CLINICAL HISTORY:  Confusion/delirium, altered LOC, unexplained;possible malignancy, confusion;    TECHNIQUE:  Low dose axial images were obtained through the head.  Coronal and sagittal reformations were also performed. Contrast was not administered.    COMPARISON:  None.    FINDINGS:  The brain parenchyma appears normal for age with good corticomedullary differentiation.  There is no evidence of acute infarct, hemorrhage, or mass.  The ventricular system is normal in size.  No mass-effect or midline shift.  There are no abnormal extra-axial fluid collections.  The paranasal sinuses and mastoid air cells are clear.  The calvarium appears intact. Deformity of the medial wall right orbit suggesting prior injury..                               CT Chest Without Contrast (In process)                CT Abdomen Pelvis  Without Contrast (In process)                X-Ray Chest PA And Lateral (Final result)  Result time 10/15/18 10:44:23    Final result by Zen Valle III, MD (10/15/18 10:44:23)                 Impression:      See above    There is a possible right lung nodule.  CT is recommended for further evaluation.      Electronically signed by: Zen Valle MD  Date:    10/15/2018  Time:    10:44             Narrative:    EXAMINATION:  XR CHEST PA AND LATERAL    CLINICAL HISTORY:  Tachycardia, unspecified    FINDINGS:  There are pleural calcifications.  There is a possible right lung nodule.  Heart is normal there is aortic plaque.  There is DJD.  There is interstitial prominence.                                       APC / Resident Notes:   63 year old male with medical history of DM, HLD, HTN, Hepatic stenosis, PVD, Tobacco use referred to the ED from PCP's office for evaluation of hypotension (50/30 BP read in clinic today). DDx includes but not limited to symptomatic anemia, electrolyte disturbance,  liver failure, CHF, pneumonia, SBP, sepsis, physical deconditioning. Will get labs and CXR. Will give fluids.     11:58 AM  -Work-up significant for leukocytosis 15, H/H 13/40, elevated lactate 3.7, elevated Tbil 3.2, elevated alk phos 635 (348 on 6/5/18),  (126 on 6/5/18), ALT 41, AG 14, Creatine elevated 1.8 (0.9 on 6/5/18), GFR 39.5. CXR shows possible R lung nodule recommending CT follow-up.   -Patient's BP remains hypotensive on reassessment. Will continue fluids and start broad spec antibiotics for possible sepsis. Will obtain CT chest, abd, pelv for further infectious evaluation. Contrast held as patient has had severe anaphylactic reaction in the past. Will get RUQ U/S for hepatomegaly evaluation.     4:39 PM  Patient evaluated by MICU at bedside. Suspect hepatomegaly and ascites related to HCC as patient has several environmental exposure risk factors. BP improved to 151/97 and repeat lactate 1.9. Recommend admission to medicine for abdominal paracentesis and ongoing management. Patient and family agreeable to the plan. I have discussed the care of this patient with my supervising physician.        Scribe Attestation:   Scribe #1: I performed the above scribed service and the documentation accurately describes the services I performed. I attest to the accuracy of the note.    Attending Attestation:     Physician Attestation Statement for NP/PA:   I have conducted a face to face encounter with this patient in addition to the NP/PA, due to Medical Complexity    Other NP/PA Attestation Additions:     Physical Exam: Distended, non tender abdomen with nodular hepatomegaly.   Medical Decision Making: Agree with PA plan and dispo.       Physician Attestation for Scribe:  Physician Attestation Statement for Scribe #1: I, Nuria RAI, reviewed documentation, as scribed by in my presence, and it is both accurate and complete.                    Clinical Impression:   The primary encounter diagnosis was  Hypotension, unspecified hypotension type. Diagnoses of Tachycardia, Jaundice, Hyperbilirubinemia, Leukocytosis, unspecified type, and Other ascites were also pertinent to this visit.      Disposition:   Disposition: Admitted  Condition: Serious                        Carlos Osborne PA-C  10/15/18 1815       Luther Quiñones MD  10/16/18 0811

## 2018-10-15 NOTE — SUBJECTIVE & OBJECTIVE
Past Medical History:   Diagnosis Date    Chronic back pain     Diabetes mellitus, type 2     Hepatic steatosis     Hyperlipidemia     Hypertension     PVD (peripheral vascular disease)     Tobacco use disorder        Past Surgical History:   Procedure Laterality Date    left hand      TONSILLECTOMY      UMBILICAL HERNIA REPAIR         Review of patient's allergies indicates:   Allergen Reactions    Iodine Hives    Latex, natural rubber Hives, Itching and Edema    Venom-honey bee Hives, Itching and Edema       Family History     Problem Relation (Age of Onset)    Lung cancer Father    Stroke Brother, Paternal Grandfather        Tobacco Use    Smoking status: Current Every Day Smoker    Smokeless tobacco: Current User   Substance and Sexual Activity    Alcohol use: Yes    Drug use: No    Sexual activity: Yes      Review of Systems   Constitutional: Positive for activity change, appetite change, chills, fatigue and unexpected weight change. Negative for fever.   HENT: Negative for postnasal drip, rhinorrhea and sinus pain.    Eyes: Positive for visual disturbance.   Respiratory: Negative for cough and shortness of breath.    Cardiovascular: Negative for chest pain, palpitations and leg swelling.   Gastrointestinal: Positive for abdominal distention. Negative for constipation, diarrhea, nausea and vomiting.   Endocrine: Negative for polydipsia, polyphagia and polyuria.   Genitourinary: Negative for decreased urine volume, dysuria, hematuria and urgency.   Musculoskeletal: Positive for arthralgias. Negative for neck pain and neck stiffness.   Skin: Negative.    Neurological: Positive for weakness and headaches.   Hematological: Negative.    Psychiatric/Behavioral: Negative.      Objective:     Vital Signs (Most Recent):  Temp: 98 °F (36.7 °C) (10/15/18 0945)  Pulse: 85 (10/15/18 1611)  Resp: 17 (10/15/18 1548)  BP: 91/62 (10/15/18 1611)  SpO2: (!) 94 % (10/15/18 1146) Vital Signs (24h Range):  Temp:   [97.6 °F (36.4 °C)-98 °F (36.7 °C)] 98 °F (36.7 °C)  Pulse:  [] 85  Resp:  [17-18] 17  SpO2:  [94 %-97 %] 94 %  BP: ()/(45-97) 91/62   Weight: 76.6 kg (168 lb 14 oz)  Body mass index is 27.46 kg/m².      Intake/Output Summary (Last 24 hours) at 10/15/2018 1710  Last data filed at 10/15/2018 1449  Gross per 24 hour   Intake 2525 ml   Output --   Net 2525 ml       Physical Exam   Constitutional: He is oriented to person, place, and time. Vital signs are normal. He appears well-developed. He appears ill. No distress.   Abdominal distension  Scleral icterus  Frail appearing, appears older than stated age   HENT:   Head: Normocephalic and atraumatic.   Mouth/Throat: Abnormal dentition.   edentulous   Eyes: EOM are normal. Pupils are equal, round, and reactive to light. Scleral icterus is present.   Neck: Neck supple.   Cardiovascular: Normal rate, regular rhythm, S1 normal and S2 normal.   Murmur heard.   Systolic murmur is present with a grade of 2/6.  Pulses:       Radial pulses are 2+ on the right side, and 2+ on the left side.        Dorsalis pedis pulses are 2+ on the right side, and 2+ on the left side.   Pulmonary/Chest: Effort normal. He has no wheezes. He has no rales.   Abdominal: Soft. Bowel sounds are normal. He exhibits distension and mass. There is tenderness. There is no rebound. No hernia.   Musculoskeletal: He exhibits no edema or tenderness.   Lymphadenopathy:        Head (right side): No submental, no submandibular, no tonsillar, no preauricular and no posterior auricular adenopathy present.        Head (left side): No submental, no submandibular, no tonsillar, no preauricular and no posterior auricular adenopathy present.     He has no cervical adenopathy.        Right cervical: No superficial cervical adenopathy present.       Left cervical: No superficial cervical adenopathy present.   Neurological: He is alert and oriented to person, place, and time. No cranial nerve deficit. GCS eye  subscore is 4. GCS verbal subscore is 5. GCS motor subscore is 6.   Skin: Skin is warm and dry. He is not diaphoretic.   Psychiatric: He has a normal mood and affect. His speech is normal.   Nursing note and vitals reviewed.      Vents:     Lines/Drains/Airways     Peripheral Intravenous Line                 Peripheral IV - Single Lumen 10/15/18 0958 Right Forearm less than 1 day         Peripheral IV - Single Lumen 10/15/18 1225 Left Antecubital less than 1 day              Significant Labs:    CBC/Anemia Profile:  Recent Labs   Lab  10/15/18   1005   WBC  15.21*   HGB  13.4*   HCT  40.0   PLT  219   MCV  88   RDW  20.6*        Chemistries:  Recent Labs   Lab  10/15/18   1005   NA  132*   K  5.2*   CL  99   CO2  19*   BUN  45*   CREATININE  1.8*   CALCIUM  9.0   ALBUMIN  2.0*   PROT  7.9   BILITOT  3.2*   ALKPHOS  635*   ALT  41   AST  148*       All pertinent labs within the past 24 hours have been reviewed.    Significant Imaging: I have reviewed all pertinent imaging results/findings within the past 24 hours. See HPI for independent review of CT CAP prior to radiology read.

## 2018-10-15 NOTE — ED TRIAGE NOTES
Patient transported from clinic for hypotension and increased fatigue, patient has swollen abdomen

## 2018-10-16 PROBLEM — R18.0 MALIGNANT ASCITES: Status: ACTIVE | Noted: 2018-10-16

## 2018-10-16 LAB
ALBUMIN FLD-MCNC: 0.7 G/DL
ALBUMIN SERPL BCP-MCNC: 1.7 G/DL
ALP SERPL-CCNC: 519 U/L
ALT SERPL W/O P-5'-P-CCNC: 32 U/L
ANION GAP SERPL CALC-SCNC: 13 MMOL/L
AST SERPL-CCNC: 109 U/L
BASOPHILS # BLD AUTO: 0.06 K/UL
BASOPHILS NFR BLD: 0.5 %
BILIRUB SERPL-MCNC: 3.2 MG/DL
BODY FLUID SOURCE, LDH: NORMAL
BUN SERPL-MCNC: 39 MG/DL
CALCIUM SERPL-MCNC: 8.4 MG/DL
CHLORIDE SERPL-SCNC: 104 MMOL/L
CO2 SERPL-SCNC: 16 MMOL/L
CREAT SERPL-MCNC: 1.5 MG/DL
DIFFERENTIAL METHOD: ABNORMAL
EOSINOPHIL # BLD AUTO: 0 K/UL
EOSINOPHIL NFR BLD: 0.3 %
ERYTHROCYTE [DISTWIDTH] IN BLOOD BY AUTOMATED COUNT: 20.9 %
EST. GFR  (AFRICAN AMERICAN): 56.9 ML/MIN/1.73 M^2
EST. GFR  (NON AFRICAN AMERICAN): 49.2 ML/MIN/1.73 M^2
GLUCOSE SERPL-MCNC: 79 MG/DL
GRAM STN SPEC: NORMAL
GRAM STN SPEC: NORMAL
HAV IGM SERPL QL IA: NEGATIVE
HBV CORE IGM SERPL QL IA: NEGATIVE
HBV SURFACE AG SERPL QL IA: NEGATIVE
HCT VFR BLD AUTO: 36.1 %
HCV AB SERPL QL IA: NEGATIVE
HGB BLD-MCNC: 12.2 G/DL
IMM GRANULOCYTES # BLD AUTO: 0.19 K/UL
IMM GRANULOCYTES NFR BLD AUTO: 1.6 %
LACTATE SERPL-SCNC: 2.2 MMOL/L
LDH FLD L TO P-CCNC: 156 U/L
LYMPHOCYTES # BLD AUTO: 0.6 K/UL
LYMPHOCYTES NFR BLD: 5.3 %
MAGNESIUM SERPL-MCNC: 1.3 MG/DL
MCH RBC QN AUTO: 30.1 PG
MCHC RBC AUTO-ENTMCNC: 33.8 G/DL
MCV RBC AUTO: 89 FL
MONOCYTES # BLD AUTO: 1.4 K/UL
MONOCYTES NFR BLD: 11.5 %
NEUTROPHILS # BLD AUTO: 9.8 K/UL
NEUTROPHILS NFR BLD: 80.8 %
NRBC BLD-RTO: 0 /100 WBC
PLATELET # BLD AUTO: 153 K/UL
PMV BLD AUTO: 10.6 FL
POTASSIUM SERPL-SCNC: 3.9 MMOL/L
PROT SERPL-MCNC: 6.5 G/DL
RBC # BLD AUTO: 4.05 M/UL
SODIUM SERPL-SCNC: 133 MMOL/L
SPECIMEN SOURCE: NORMAL
WBC # BLD AUTO: 12.11 K/UL

## 2018-10-16 PROCEDURE — 36415 COLL VENOUS BLD VENIPUNCTURE: CPT

## 2018-10-16 PROCEDURE — 86703 HIV-1/HIV-2 1 RESULT ANTBDY: CPT

## 2018-10-16 PROCEDURE — 63600175 PHARM REV CODE 636 W HCPCS: Performed by: INTERNAL MEDICINE

## 2018-10-16 PROCEDURE — 99233 SBSQ HOSP IP/OBS HIGH 50: CPT | Mod: ,,, | Performed by: INTERNAL MEDICINE

## 2018-10-16 PROCEDURE — 25000003 PHARM REV CODE 250: Performed by: INTERNAL MEDICINE

## 2018-10-16 PROCEDURE — 97165 OT EVAL LOW COMPLEX 30 MIN: CPT

## 2018-10-16 PROCEDURE — 80053 COMPREHEN METABOLIC PANEL: CPT

## 2018-10-16 PROCEDURE — 63600175 PHARM REV CODE 636 W HCPCS: Performed by: STUDENT IN AN ORGANIZED HEALTH CARE EDUCATION/TRAINING PROGRAM

## 2018-10-16 PROCEDURE — 85025 COMPLETE CBC W/AUTO DIFF WBC: CPT

## 2018-10-16 PROCEDURE — 83735 ASSAY OF MAGNESIUM: CPT

## 2018-10-16 PROCEDURE — 11000001 HC ACUTE MED/SURG PRIVATE ROOM

## 2018-10-16 PROCEDURE — 25000003 PHARM REV CODE 250: Performed by: STUDENT IN AN ORGANIZED HEALTH CARE EDUCATION/TRAINING PROGRAM

## 2018-10-16 PROCEDURE — 83605 ASSAY OF LACTIC ACID: CPT

## 2018-10-16 RX ORDER — DIPHENHYDRAMINE HCL 25 MG
25 CAPSULE ORAL DAILY PRN
COMMUNITY

## 2018-10-16 RX ORDER — PANTOPRAZOLE SODIUM 40 MG/1
40 TABLET, DELAYED RELEASE ORAL DAILY
Status: DISCONTINUED | OUTPATIENT
Start: 2018-10-16 | End: 2018-10-16

## 2018-10-16 RX ORDER — DIPHENHYDRAMINE HCL 25 MG
50 CAPSULE ORAL ONCE
Status: COMPLETED | OUTPATIENT
Start: 2018-10-17 | End: 2018-10-17

## 2018-10-16 RX ORDER — GABAPENTIN 300 MG/1
300 CAPSULE ORAL DAILY
COMMUNITY

## 2018-10-16 RX ORDER — CEFTRIAXONE 1 G/1
1 INJECTION, POWDER, FOR SOLUTION INTRAMUSCULAR; INTRAVENOUS
Status: DISCONTINUED | OUTPATIENT
Start: 2018-10-16 | End: 2018-10-17

## 2018-10-16 RX ORDER — METRONIDAZOLE 500 MG/1
500 TABLET ORAL EVERY 8 HOURS
Status: DISCONTINUED | OUTPATIENT
Start: 2018-10-16 | End: 2018-10-18

## 2018-10-16 RX ORDER — PANTOPRAZOLE SODIUM 40 MG/1
40 TABLET, DELAYED RELEASE ORAL DAILY
Status: DISCONTINUED | OUTPATIENT
Start: 2018-10-16 | End: 2018-10-18 | Stop reason: HOSPADM

## 2018-10-16 RX ADMIN — ONDANSETRON 8 MG: 4 TABLET, ORALLY DISINTEGRATING ORAL at 05:10

## 2018-10-16 RX ADMIN — ACETAMINOPHEN 650 MG: 325 TABLET ORAL at 12:10

## 2018-10-16 RX ADMIN — PREDNISONE 50 MG: 20 TABLET ORAL at 11:10

## 2018-10-16 RX ADMIN — PANTOPRAZOLE SODIUM 40 MG: 40 TABLET, DELAYED RELEASE ORAL at 11:10

## 2018-10-16 RX ADMIN — SODIUM CHLORIDE 500 ML: 0.9 INJECTION, SOLUTION INTRAVENOUS at 12:10

## 2018-10-16 RX ADMIN — METRONIDAZOLE 500 MG: 500 TABLET ORAL at 10:10

## 2018-10-16 RX ADMIN — CEFTRIAXONE SODIUM 1 G: 1 INJECTION, POWDER, FOR SOLUTION INTRAMUSCULAR; INTRAVENOUS at 11:10

## 2018-10-16 RX ADMIN — PIPERACILLIN AND TAZOBACTAM 4.5 G: 4; .5 INJECTION, POWDER, LYOPHILIZED, FOR SOLUTION INTRAVENOUS; PARENTERAL at 04:10

## 2018-10-16 RX ADMIN — METRONIDAZOLE 500 MG: 500 TABLET ORAL at 01:10

## 2018-10-16 NOTE — ASSESSMENT & PLAN NOTE
Diagnostic paracentesis 10/15 showed SAAG 1.0, no organisms seen on Gram Stain, culture pending  In setting of new findings on CT concerning for Lung primary with abdominal metastases  Therapeutic Thoracentesis today

## 2018-10-16 NOTE — ASSESSMENT & PLAN NOTE
Ddx includes gallstones, malignancy, infection   CT showing a calcified gallstone with no wall thickening or pericholecystic fluid and an showing enlarged and heterogeneous liver concerning for underlying liver disease. Hepatology consulted, appreciate recommendations  Abd US showing enlarged liver with heterogeneous echotexture and nodular contour suggesting cirrhosis

## 2018-10-16 NOTE — ASSESSMENT & PLAN NOTE
-Patient presented to ED with hypotension and 2/4 SIRS (HR>100, WBC>12) with suspected abdominal source  -SIRS 1/4 today (WBC 12)  -qSOFA score of 2 on admission, 0 today  -Lactic acic 3.7 on admit. Improved to 1.7 after 1 L NS  -UA neg, CXR with possible right lung nodule  -vanc and zosyn in ED, Abx deescalated to Ceftriaxone/Metronidazole 10/16  -Blood cultures NGTD  -CT concerning for primary lung malignancy with lung and abdominal metastases  -diagnostic paracentesis showing SAAG 1.0, No organisms seen on gram stain, cultures pending.

## 2018-10-16 NOTE — ASSESSMENT & PLAN NOTE
- Cr 1.8 on admit, baseline 0.9  - likely pre-renal from hypotension  - s/p 1 L NS   - will given additional 500cc NS bolus   - Check urine studies  - renally dose medications   - Hold lisinopril

## 2018-10-16 NOTE — ASSESSMENT & PLAN NOTE
- Patient with known hepatic steatosis who presents with jaundice  - Ddx includes acute hepatitis, gallstones, mets to liver, HCC, drug-induced  - No significant history of alcohol use  - No excessive tylenol use  - CT abd/pelvis showing considerable mesenteric edema as well as peritoneal disease and some small mesenteric nodules, enlarged liver concerning for hepatic disease, and T-12 lytic lesion concerning all concerning for metastatic neoplasias   - Hepatitis antibodies negative  - AFP 0.8  - Hold statin

## 2018-10-16 NOTE — PLAN OF CARE
10/16/18 1546   Discharge Assessment   Assessment Type Discharge Planning Assessment   Confirmed/corrected address and phone number on facesheet? Yes   Assessment information obtained from? Patient   Expected Length of Stay (days) 3   Communicated expected length of stay with patient/caregiver yes   Prior to hospitilization cognitive status: Alert/Oriented   Prior to hospitalization functional status: Independent   Current cognitive status: Alert/Oriented   Current Functional Status: Independent   Lives With spouse   Able to Return to Prior Arrangements yes   Is patient able to care for self after discharge? Yes   Readmission Within The Last 30 Days no previous admission in last 30 days   Patient currently being followed by outpatient case management? No   Patient currently receives any other outside agency services? No   Equipment Currently Used at Home none   Do you have any problems affording any of your prescribed medications? No   Is the patient taking medications as prescribed? yes   Does the patient have transportation home? Yes   Transportation Available car   Does the patient receive services at the [facility]? No   Discharge Plan A Home   Discharge Plan B Home with family   Patient/Family In Agreement With Plan yes

## 2018-10-16 NOTE — PT/OT/SLP EVAL
Occupational Therapy   Evaluation    Name: Madhu Grant  MRN: [id number]  Admitting Diagnosis:  Sepsis      Recommendations:     Discharge Recommendations: home health  Discharge Equipment Recommendations:  (TBD pending progress)  Barriers to discharge:   None    History:     Occupational Profile:  Living Environment: Pt lives with wife in [facility], 1STE, tub/shower  Previous level of function: Pt reports being (I) with mobility.  Within the last week pt has required assist for bathing, but able to perform other ADLs independently.   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)  Equipment Used at Home:  (pt owns SPC, but was not currently using it)  Assistance upon Discharge: Wife able to provide assist    Past Medical History:   Diagnosis Date    Chronic back pain     Diabetes mellitus, type 2     Hepatic steatosis     Hyperlipidemia     Hypertension     PVD (peripheral vascular disease)     Tobacco use disorder          Past Surgical History:   Procedure Laterality Date    left hand      TONSILLECTOMY      UMBILICAL HERNIA REPAIR         Subjective     Chief Complaint: Wants to eat/drink  Patient/Family Comments/goals: Resume PLOF    Pain/Comfort:  Pain Rating 1: 0/10  Pain Rating Post-Intervention 1: 0/10    Patients cultural, spiritual, Cheondoism conflicts given the current situation: None stated    Objective:     Communicated with: RN prior to session.  Patient found all lines intact, call button in reach and wife present and telemetry upon OT entry to room.    General Precautions: Standard, fall   Orthopedic Precautions:N/A   Braces: N/A     Occupational Performance:    Bed Mobility:    · Patient completed Rolling/Turning to Right with minimum assistance  · Patient completed Scooting/Bridging with contact guard assistance  · Patient completed Supine to Sit with minimum assistance  · Patient completed Sit to Supine with minimum assistance    Functional  Mobility/Transfers:  · Patient completed Sit <> Stand Transfer with minimum assistance  with  hand-held assist x 1 trial from EOB  · Functional Mobility: Pt took ten steps in place with Min A and HHA.  Postural instability noted, but no instances of LOB observed.  Pt declined walking further distance 2* increased fatigue.    Activities of Daily Living:  · Grooming: stand by assistance for wiping face with RUE while supine with HOB elevated.    Cognitive/Visual Perceptual:  Cognitive/Psychosocial Skills:    -       Oriented to: Person, Place, Time and Situation   -       Follows Commands/attention:Follows multistep  commands  -       Communication: clear/fluent  -       Memory: No Deficits noted  -       Safety awareness/insight to disability: intact   -       Mood/Affect/Coping skills/emotional control: Appropriate to situation    Physical Exam:  Postural examination/scapula alignment: -       No postural abnormalities identified  Skin integrity: Visible skin intact  Edema:  None noted  Sensation: -       Intact   Motor Planning: WNL  Dominant hand: Right  Upper Extremity Range of Motion:  WNL  Upper Extremity Strength: WFL; grossly 3+/5 for all muscle groups   Strength: 4/5  Fine Motor Coordination: Intact  Gross motor coordination: WFL  Balance:  Sitting- SBA, Standing- CGA-Min A    AMPAC 6 Click ADL:  AMPAC Total Score: 19    Treatment & Education:  *Role of OT/POC  Education:    Patient left supine with all lines intact, call button in reach, RN notified and spouse present    Assessment:     Madhu Grant is a 63 y.o. male with a medical diagnosis of Sepsis.  He presents with the following performance deficits affecting function: weakness, impaired endurance, impaired self care skills, impaired functional mobilty, impaired balance, gait instability.  Pt displays some weakness in UB and decreased activity tolerance impacting participation with ADLs.  While taking steps in place mild postural instability noted,  "but no instances of LOB noted.  PTA pt reports being (I) with ADLs except for bathing and (I) for mobility.  Pt is not at PLOF and would benefit from skilled OT services to address problems listed below and increase independence with ADLs.  Home health is recommended upon d/c from acute care to further address deficits and help pt improve overall functional independence.     Rehab Prognosis: Good; patient would benefit from acute skilled OT services to address these deficits and reach maximum level of function.         Clinical Decision Makin.  OT Low:  "Pt evaluation falls under low complexity for evaluation coding due to performance deficits noted in 1-3 areas as stated above and 0 co-morbities affecting current functional status. Data obtained from problem focused assessments. No modifications or assistance was required for completion of evaluation. Only brief occupational profile and history review completed."     Plan:     Patient to be seen 4 x/week to address the above listed problems via self-care/home management, therapeutic activities, therapeutic exercises  · Plan of Care Expires: 11/15/18  · Plan of Care Reviewed with: patient, spouse    This Plan of care has been discussed with the patient who was involved in its development and understands and is in agreement with the identified goals and treatment plan    GOALS:   Multidisciplinary Problems     Occupational Therapy Goals        Problem: Occupational Therapy Goal    Goal Priority Disciplines Outcome Interventions   Occupational Therapy Goal     OT, PT/OT     Description:  Goals to be met by: 10/23/2018    Patient will increase functional independence with ADLs by performing:    UE Dressing with Contact Guard Assistance.  LE Dressing with Contact Guard Assistance.  Grooming while standing with Contact Guard Assistance.  Toileting from toilet with Contact Guard Assistance for hygiene and clothing management.   Toilet transfer to toilet with " Contact Guard Assistance.  Pt will perform functional task while standing for three minutes with no instances of LOB.                      Time Tracking:     OT Date of Treatment: 10/16/18  OT Start Time: 1116  OT Stop Time: 1126  OT Total Time (min): 10 min    Billable Minutes:Evaluation 10    LAVERNE Hobbs  10/16/2018

## 2018-10-16 NOTE — ASSESSMENT & PLAN NOTE
- patient presents with constant dull frontal headache  - suspect that this is a tension headache from recent hypotension and reduced po intake  - due to diplopia this morning, slowed mentation, and suspected malignancy (lung), will order CT head  - tylenol prn

## 2018-10-16 NOTE — PROGRESS NOTES
[facility] Hospital Medicine  Progress Note    Patient Name: Madhu Grant  MRN: [id number]  Patient Class: IP- Inpatient   Admission Date: 10/15/2018  Length of Stay: 1 days  Attending Physician: Alejandra Jones MD  Primary Care Provider: EL Sheridan MD    [facility] Medicine Team: [facility] HOSP MED 3 Oniel Burns DO    Subjective:     Principal Problem:Sepsis    HPI:  62 yo male with pmh of chronic back pain, HTN, HLD, hepatic steatosis, and tobacco abuse who presented to ED from PCP for hypotension and fatigue. Blood pressure at PCP office was noted to be 50/30 for which he was urgently sent to ED.. Patient states that he has been feeling fatigued and lethargic for the past 2 weeks due which he had poor appetite and weight loss. He was also noted to be jaundiced. Patient's wife states that patient's symptoms started closer to 6 months ago and have progressed. Patient has also exhibited slowed mentation and confusion over the past 2 weeks. Patient endorses constant dull frontal headache with photophobia and notes that he work up with blurred vision this morning. He endorses nausea and had 2 episodes of vomiting yesterday. he denies any fever/chills, abdominal pain, diarrhea, dysuria rashes.     In ED, patient's BP was 78/48 with HR in low 100s. Lactic acid elevated to 3.7. He was given 1 L NS bolus with improvement in BP to 102/59. Critical care was consulted and recommended admission to floor.    Hospital Course:  10/16/2018 No acute events overnight. Pt noting symptoms similar to previous episodes of GERD    Past Medical History:   Diagnosis Date    Chronic back pain     Diabetes mellitus, type 2     Hepatic steatosis     Hyperlipidemia     Hypertension     PVD (peripheral vascular disease)     Tobacco use disorder        Past Surgical History:   Procedure Laterality Date    left hand      TONSILLECTOMY      UMBILICAL HERNIA REPAIR         Review of patient's allergies indicates:    Allergen Reactions    Iodine Hives    Latex, natural rubber Hives, Itching and Edema    Venom-honey bee Hives, Itching and Edema       No current facility-administered medications on file prior to encounter.      Current Outpatient Medications on File Prior to Encounter   Medication Sig    aspirin (ECOTRIN) 81 MG EC tablet Take 81 mg by mouth once daily.    diphenhydrAMINE (BENADRYL) 25 mg capsule Take 25 mg by mouth daily as needed for Itching.    gabapentin (NEURONTIN) 300 MG capsule Take 300 mg by mouth once daily.    HYDROcodone-acetaminophen (NORCO)  mg per tablet TAKE ONE TABLET BY MOUTH EVERY 4 TO 6 HOURS AS NEEDED FOR PAIN    lisinopril (PRINIVIL,ZESTRIL) 20 MG tablet TAKE ONE TABLET (20 MG TOTAL) BY MOUTH ONCE DAILY    pravastatin (PRAVACHOL) 40 MG tablet TAKE ONE TABLET BY MOUTH ONCE DAILY IN THE EVENING    [DISCONTINUED] cyclobenzaprine (FLEXERIL) 10 MG tablet TAKE 1 TABLET BY MOUTH THREE TIMES DAILY    [DISCONTINUED] gabapentin (NEURONTIN) 300 MG capsule TAKE TWO CAPSULES (600 MG TOTAL)BY MOUTH THREE TIMES DAILY     Family History     Problem Relation (Age of Onset)    Lung cancer Father    Stroke Brother, Paternal Grandfather        Tobacco Use    Smoking status: Current Every Day Smoker    Smokeless tobacco: Current User   Substance and Sexual Activity    Alcohol use: Yes    Drug use: No    Sexual activity: Yes     Review of Systems   Constitutional: Positive for fatigue. Negative for chills and fever.   HENT: Negative for trouble swallowing.    Eyes: Positive for visual disturbance.   Respiratory: Negative for cough and shortness of breath.    Cardiovascular: Negative for chest pain, palpitations and leg swelling.   Gastrointestinal: Positive for abdominal distention and nausea. Negative for abdominal pain, diarrhea and vomiting.   Genitourinary: Negative for dysuria and hematuria.   Musculoskeletal: Positive for arthralgias and back pain.   Skin: Positive for color change.    Neurological: Positive for weakness and headaches. Negative for syncope and speech difficulty.     Objective:     Vital Signs (Most Recent):  Temp: 96.1 °F (35.6 °C) (10/16/18 1154)  Pulse: 84 (10/16/18 1154)  Resp: 20 (10/16/18 1154)  BP: (!) 102/58 (10/16/18 1154)  SpO2: 97 % (10/16/18 1154) Vital Signs (24h Range):  Temp:  [96.1 °F (35.6 °C)-97.9 °F (36.6 °C)] 96.1 °F (35.6 °C)  Pulse:  [81-92] 84  Resp:  [17-20] 20  SpO2:  [92 %-97 %] 97 %  BP: ()/(54-97) 102/58     Weight: 76.6 kg (168 lb 14 oz)  Body mass index is 27.46 kg/m².    Physical Exam   Constitutional: He is oriented to person, place, and time. He appears well-developed and well-nourished.   HENT:   Head: Normocephalic and atraumatic.   Right Ear: External ear normal.   Left Ear: External ear normal.   Eyes: Scleral icterus is present.   Neck: Neck supple.   Cardiovascular: Normal rate, regular rhythm, normal heart sounds and intact distal pulses.   Pulmonary/Chest: Effort normal and breath sounds normal.   Abdominal: Soft. He exhibits distension. There is hepatomegaly. There is no tenderness.   Hepatomegaly  +fluid wave    Musculoskeletal: He exhibits no edema.   Neurological: He is alert and oriented to person, place, and time.   Slowed mentation    Skin: Skin is warm and dry.   jaundiced   Psychiatric: He has a normal mood and affect. His behavior is normal.           Significant Labs: All pertinent labs within the past 24 hours have been reviewed.    Significant Imaging: I have reviewed all pertinent imaging results/findings within the past 24 hours.    Assessment/Plan:      * Sepsis    -Patient presented to ED with hypotension and 2/4 SIRS (HR>100, WBC>12) with suspected abdominal source  -SIRS 1/4 today (WBC 12)  -qSOFA score of 2 on admission, 0 today  -Lactic acic 3.7 on admit. Improved to 1.7 after 1 L NS  -UA neg, CXR with possible right lung nodule  -vanc and zosyn in ED, Abx deescalated to Ceftriaxone/Metronidazole 10/16  -Blood  cultures NGTD  -CT concerning for primary lung malignancy with lung and abdominal metastases  -diagnostic paracentesis showing SAAG 1.0, No organisms seen on gram stain, cultures pending.         Malignant ascites    Diagnostic paracentesis 10/15 showed SAAG 1.0, no organisms seen on Gram Stain, culture pending  In setting of new findings on CT concerning for Lung primary with abdominal metastases  Therapeutic Thoracentesis today          Tension type headache    - patient presents with constant dull frontal headache  - suspect that this is a tension headache from recent hypotension and reduced po intake  - due to diplopia, slowed mentation, and suspected malignancy (lung), head CT obtained showing no acute intracranial abnormalities  - tylenol prn           Chronic back pain    gabapentin held on admission due to confusion/delirium   CT C/A/P revealing T12 lytic bone lesion concerning for metastatic disease          Hyperbilirubinemia    Ddx includes gallstones, malignancy, infection   CT showing a calcified gallstone with no wall thickening or pericholecystic fluid and an showing enlarged and heterogeneous liver concerning for underlying liver disease. Hepatology consulted, appreciate recommendations  Abd US showing enlarged liver with heterogeneous echotexture and nodular contour suggesting cirrhosis        Pleural plaque    See pulmonary nodules/lesions          Transaminitis    - Patient with known hepatic steatosis who presents with jaundice  - Ddx includes acute hepatitis, gallstones, mets to liver, HCC, drug-induced  - No significant history of alcohol use  - No excessive tylenol use  - CT abd/pelvis showing considerable mesenteric edema as well as peritoneal disease and some small mesenteric nodules, enlarged liver concerning for hepatic disease, and T-12 lytic lesion concerning all concerning for metastatic neoplasias   - Hepatitis antibodies negative  - AFP 0.8  - Hold statin         ELEANOR (acute kidney  injury)    - Cr 1.8 on admit, baseline 0.9  - likely pre-renal from hypotension  -  Improved to 1.5 s/p 1.5 L NS    - Check urine studies  - renally dose medications   - Hold lisinopril           Hyperkalemia    Likely due to ELEANOR   S/p IV fluid  Improved to 3.9 this AM          Leukocytosis    Suspect either due to infection or malignancy  See sepsis           Pulmonary nodules/lesions, multiple    - Seen on CXR and CT chest  - CT chest noting Focal region of opacity in the left lower lobe with irregular margins, suspicious for primary neoplasm of the lung, numerous soft tissue nodules in the bilateral lungs.  Pleural thickening and calcification as well as left trace pleural fluid  - With long history of smoking, suspect primary malignancy or mets  - IR biopsy of lung tomorrow            Hepatomegaly    - DDx includes fatty liver disease, HCC, mets to liver  - AFP 0.9, Hep A, B, C antibodies negative  - CT abd/pelvis showing enlarged and heterogeneous liver concerning for underlying liver disease.   - Hepatology consulted, appreciate recommendations        Elevated lactic acid level    Due to hypotension   Improved after IV fluids          Hypotension    Likely due to poor PO intake with possible underlying infection   Improved after fluid resuscitation   Hold lisinopril             VTE Risk Mitigation (From admission, onward)        Ordered     enoxaparin injection 40 mg  Daily      10/15/18 1912     IP VTE LOW RISK PATIENT  Once      10/15/18 1724              Oniel Burns DO  Department of Hospital Medicine   [facility]

## 2018-10-16 NOTE — ASSESSMENT & PLAN NOTE
- patient presents with constant dull frontal headache  - suspect that this is a tension headache from recent hypotension and reduced po intake  - due to diplopia, slowed mentation, and suspected malignancy (lung), head CT obtained showing no acute intracranial abnormalities  - tylenol prn

## 2018-10-16 NOTE — SUBJECTIVE & OBJECTIVE
Past Medical History:   Diagnosis Date    Chronic back pain     Diabetes mellitus, type 2     Hepatic steatosis     Hyperlipidemia     Hypertension     PVD (peripheral vascular disease)     Tobacco use disorder        Past Surgical History:   Procedure Laterality Date    left hand      TONSILLECTOMY      UMBILICAL HERNIA REPAIR         Review of patient's allergies indicates:   Allergen Reactions    Iodine Hives    Latex, natural rubber Hives, Itching and Edema    Venom-honey bee Hives, Itching and Edema       No current facility-administered medications on file prior to encounter.      Current Outpatient Medications on File Prior to Encounter   Medication Sig    aspirin (ECOTRIN) 81 MG EC tablet Take 81 mg by mouth once daily.    diphenhydrAMINE (BENADRYL) 25 mg capsule Take 25 mg by mouth daily as needed for Itching.    gabapentin (NEURONTIN) 300 MG capsule Take 300 mg by mouth once daily.    HYDROcodone-acetaminophen (NORCO)  mg per tablet TAKE ONE TABLET BY MOUTH EVERY 4 TO 6 HOURS AS NEEDED FOR PAIN    lisinopril (PRINIVIL,ZESTRIL) 20 MG tablet TAKE ONE TABLET (20 MG TOTAL) BY MOUTH ONCE DAILY    pravastatin (PRAVACHOL) 40 MG tablet TAKE ONE TABLET BY MOUTH ONCE DAILY IN THE EVENING    [DISCONTINUED] cyclobenzaprine (FLEXERIL) 10 MG tablet TAKE 1 TABLET BY MOUTH THREE TIMES DAILY    [DISCONTINUED] gabapentin (NEURONTIN) 300 MG capsule TAKE TWO CAPSULES (600 MG TOTAL)BY MOUTH THREE TIMES DAILY     Family History     Problem Relation (Age of Onset)    Lung cancer Father    Stroke Brother, Paternal Grandfather        Tobacco Use    Smoking status: Current Every Day Smoker    Smokeless tobacco: Current User   Substance and Sexual Activity    Alcohol use: Yes    Drug use: No    Sexual activity: Yes     Review of Systems   Constitutional: Positive for fatigue. Negative for chills and fever.   HENT: Negative for trouble swallowing.    Eyes: Positive for visual disturbance.    Respiratory: Negative for cough and shortness of breath.    Cardiovascular: Negative for chest pain, palpitations and leg swelling.   Gastrointestinal: Positive for abdominal distention and nausea. Negative for abdominal pain, diarrhea and vomiting.   Genitourinary: Negative for dysuria and hematuria.   Musculoskeletal: Positive for arthralgias and back pain.   Skin: Positive for color change.   Neurological: Positive for weakness and headaches. Negative for syncope and speech difficulty.     Objective:     Vital Signs (Most Recent):  Temp: 96.1 °F (35.6 °C) (10/16/18 1154)  Pulse: 84 (10/16/18 1154)  Resp: 20 (10/16/18 1154)  BP: (!) 102/58 (10/16/18 1154)  SpO2: 97 % (10/16/18 1154) Vital Signs (24h Range):  Temp:  [96.1 °F (35.6 °C)-97.9 °F (36.6 °C)] 96.1 °F (35.6 °C)  Pulse:  [81-92] 84  Resp:  [17-20] 20  SpO2:  [92 %-97 %] 97 %  BP: ()/(54-97) 102/58     Weight: 76.6 kg (168 lb 14 oz)  Body mass index is 27.46 kg/m².    Physical Exam   Constitutional: He is oriented to person, place, and time. He appears well-developed and well-nourished.   HENT:   Head: Normocephalic and atraumatic.   Right Ear: External ear normal.   Left Ear: External ear normal.   Eyes: Scleral icterus is present.   Neck: Neck supple.   Cardiovascular: Normal rate, regular rhythm, normal heart sounds and intact distal pulses.   Pulmonary/Chest: Effort normal and breath sounds normal.   Abdominal: Soft. He exhibits distension. There is hepatomegaly. There is no tenderness.   Hepatomegaly  +fluid wave    Musculoskeletal: He exhibits no edema.   Neurological: He is alert and oriented to person, place, and time.   Slowed mentation    Skin: Skin is warm and dry.   jaundiced   Psychiatric: He has a normal mood and affect. His behavior is normal.           Significant Labs: All pertinent labs within the past 24 hours have been reviewed.    Significant Imaging: I have reviewed all pertinent imaging results/findings within the past 24  hours.

## 2018-10-16 NOTE — ASSESSMENT & PLAN NOTE
- Patient with known hepatic steatosis who presents with jaundice  - Ddx includes acute hepatitis, gallstones, mets to liver, HCC, drug-induced  - No significant history of alcohol use  - No excessive tylenol use  - CT abd/pelvis non-con ordered. Will f/u results  - Check acute hepatitis  - Check AFP  - Hold statin

## 2018-10-16 NOTE — PROCEDURES
"Madhu Grant is a 63 y.o. male patient.    Temp: 97.9 °F (36.6 °C) (10/16/18 0012)  Pulse: 88 (10/16/18 0012)  Resp: 19 (10/16/18 0012)  BP: 112/63 (10/16/18 0012)  SpO2: 97 % (10/16/18 0012)  Weight: 76.6 kg (168 lb 14 oz) (10/15/18 0945)  Height: 5' 5.75" (167 cm) (10/15/18 0945)       Paracentesis  Date/Time: 10/16/2018 12:40 AM  Location procedure was performed: [facility] EMERGENCY DEPARTMENT  Performed by: Mannie Maloney MD  Authorized by: Mannie Maloney MD   Pre-operative diagnosis: hepatomegaly  Post-operative diagnosis: hepatomegaly  Consent Done: Yes  Consent: Verbal consent obtained. Written consent obtained.  Consent given by: patient  Patient understanding: patient states understanding of the procedure being performed  Patient consent: the patient's understanding of the procedure matches consent given  Procedure consent: procedure consent matches procedure scheduled  Relevant documents: relevant documents present and verified  Test results: test results available and properly labeled  Site marked: the operative site was marked  Imaging studies: imaging studies available  Required items: required blood products, implants, devices, and special equipment available  Patient identity confirmed:  and name  Time out: Immediately prior to procedure a "time out" was called to verify the correct patient, procedure, equipment, support staff and site/side marked as required.  Initial or subsequent exam: initial  Procedure purpose: diagnostic  Indications: new onset ascites  Anesthesia: local infiltration    Anesthesia:  Local Anesthetic: lidocaine 1% without epinephrine  Anesthetic total: 5 mL  Patient sedated: no  Preparation: Patient was prepped and draped in the usual sterile fashion.  Needle gauge: 18  Ultrasound guidance: yes  Puncture site: left lower quadrant  Fluid removed: 30(ml)  Fluid appearance: serous  Dressing: 4x4 sterile gauze  Complications: No  Estimated blood loss (mL): 2  Patient " tolerance: Patient tolerated the procedure well with no immediate complications          Mannie PATE Amara  10/16/2018

## 2018-10-16 NOTE — ASSESSMENT & PLAN NOTE
- DDx includes fatty liver disease, HCC, mets to liver  - AFP 0.9, Hep A, B, C antibodies negative  - CT abd/pelvis showing enlarged and heterogeneous liver concerning for underlying liver disease.   - Hepatology consulted, appreciate recommendations

## 2018-10-16 NOTE — PLAN OF CARE
Problem: Fall Risk (Adult)  Goal: Absence of Falls  Patient will demonstrate the desired outcomes by discharge/transition of care.  Outcome: Ongoing (interventions implemented as appropriate)   10/16/18 0321   Fall Risk (Adult)   Absence of Falls making progress toward outcome       Problem: Patient Care Overview  Goal: Plan of Care Review  Outcome: Ongoing (interventions implemented as appropriate)  Pt admitted to unit from ED, pt oriented to room, family at bedside, VS stable, able to make needs known, no distress noted, will continue to monitor.

## 2018-10-16 NOTE — HOSPITAL COURSE
10/16/2018 No acute events overnight. Pt noting symptoms similar to previous episodes of GERD  10/17/2018 Patient prepped overnight for iodine allergy. Plan for CT triple phase today.

## 2018-10-16 NOTE — PLAN OF CARE
Problem: Occupational Therapy Goal  Goal: Occupational Therapy Goal  Goals to be met by: 10/23/2018    Patient will increase functional independence with ADLs by performing:    UE Dressing with Contact Guard Assistance.  LE Dressing with Contact Guard Assistance.  Grooming while standing with Contact Guard Assistance.  Toileting from toilet with Contact Guard Assistance for hygiene and clothing management.   Toilet transfer to toilet with Contact Guard Assistance.  Pt will perform functional task while standing for three minutes with no instances of LOB.      OT evaluation complete and POC established.  Home health is recommended upon d/c from acute care to further address deficits and help pt improve overall functional independence.     LAVERNE Hobbs  10/16/2018

## 2018-10-16 NOTE — PLAN OF CARE
Problem: Patient Care Overview  Goal: Plan of Care Review  Outcome: Ongoing (interventions implemented as appropriate)  Plan of care reviewed.  Pt to be NPO at midnight for biopsy 10/17. Pt has had 4-5 BMs. MD notified. Pt has order for 500cc bolus prior to CT scan tonight. Pt lying in bed sleep with wife at bedside. No distress noted. Will cont monitor

## 2018-10-16 NOTE — SUBJECTIVE & OBJECTIVE
Past Medical History:   Diagnosis Date    Chronic back pain     Diabetes mellitus, type 2     Hepatic steatosis     Hyperlipidemia     Hypertension     PVD (peripheral vascular disease)     Tobacco use disorder        Past Surgical History:   Procedure Laterality Date    left hand      TONSILLECTOMY      UMBILICAL HERNIA REPAIR         Review of patient's allergies indicates:   Allergen Reactions    Iodine Hives    Latex, natural rubber Hives, Itching and Edema    Venom-honey bee Hives, Itching and Edema       No current facility-administered medications on file prior to encounter.      Current Outpatient Medications on File Prior to Encounter   Medication Sig    aspirin (ECOTRIN) 81 MG EC tablet Take 81 mg by mouth once daily.    gabapentin (NEURONTIN) 300 MG capsule TAKE TWO CAPSULES (600 MG TOTAL)BY MOUTH THREE TIMES DAILY    HYDROcodone-acetaminophen (NORCO)  mg per tablet TAKE ONE TABLET BY MOUTH EVERY 4 TO 6 HOURS AS NEEDED FOR PAIN    lisinopril (PRINIVIL,ZESTRIL) 20 MG tablet TAKE ONE TABLET (20 MG TOTAL) BY MOUTH ONCE DAILY    pravastatin (PRAVACHOL) 40 MG tablet TAKE ONE TABLET BY MOUTH ONCE DAILY IN THE EVENING    cyclobenzaprine (FLEXERIL) 10 MG tablet TAKE 1 TABLET BY MOUTH THREE TIMES DAILY    [DISCONTINUED] DIPHENHYDRAMINE HCL (BENADRYL ALLERGY ORAL) Take by mouth.    [DISCONTINUED] lidocaine 4 % Gel Apply topically.      [DISCONTINUED] lidocaine HCL 2% (XYLOCAINE) 2 % jelly APPLY   TOPICALLY TO AFFECTED AREA TWICE DAILY TO THREE TIMES DAILY    [DISCONTINUED] zolpidem (AMBIEN) 10 mg Tab TAKE ONE TABLET BY MOUTH AT BEDTIME AS NEEDED     Family History     Problem Relation (Age of Onset)    Lung cancer Father    Stroke Brother, Paternal Grandfather        Tobacco Use    Smoking status: Current Every Day Smoker    Smokeless tobacco: Current User   Substance and Sexual Activity    Alcohol use: Yes    Drug use: No    Sexual activity: Yes     Review of Systems    Constitutional: Positive for fatigue. Negative for chills and fever.   HENT: Negative for trouble swallowing.    Eyes: Positive for visual disturbance.   Respiratory: Negative for cough and shortness of breath.    Cardiovascular: Negative for chest pain, palpitations and leg swelling.   Gastrointestinal: Positive for abdominal distention, nausea and vomiting. Negative for abdominal pain and diarrhea.   Genitourinary: Negative for dysuria and hematuria.   Musculoskeletal: Positive for arthralgias and back pain.   Skin: Positive for color change.   Neurological: Positive for weakness and headaches. Negative for syncope and speech difficulty.     Objective:     Vital Signs (Most Recent):  Temp: 98 °F (36.7 °C) (10/15/18 0945)  Pulse: 92 (10/15/18 2241)  Resp: 17 (10/15/18 1548)  BP: 104/63 (10/15/18 2241)  SpO2: 95 % (10/15/18 2241) Vital Signs (24h Range):  Temp:  [97.6 °F (36.4 °C)-98 °F (36.7 °C)] 98 °F (36.7 °C)  Pulse:  [] 92  Resp:  [17-18] 17  SpO2:  [94 %-97 %] 95 %  BP: ()/(45-97) 104/63     Weight: 76.6 kg (168 lb 14 oz)  Body mass index is 27.46 kg/m².    Physical Exam   Constitutional: He is oriented to person, place, and time. He appears well-developed and well-nourished.   HENT:   Head: Normocephalic and atraumatic.   Right Ear: External ear normal.   Left Ear: External ear normal.   Eyes: Scleral icterus is present.   Neck: Neck supple.   Cardiovascular: Normal rate, regular rhythm, normal heart sounds and intact distal pulses.   Pulmonary/Chest: Effort normal and breath sounds normal.   Abdominal: Soft. He exhibits distension. There is no tenderness.   Hepatomegaly  +fluid wave    Musculoskeletal: He exhibits no edema.   Neurological: He is alert and oriented to person, place, and time.   Slowed mentation    Skin: Skin is warm and dry.   jaundiced   Psychiatric: He has a normal mood and affect. His behavior is normal.           Significant Labs: All pertinent labs within the past 24 hours  have been reviewed.    Significant Imaging: I have reviewed all pertinent imaging results/findings within the past 24 hours.

## 2018-10-16 NOTE — ASSESSMENT & PLAN NOTE
- DDx includes fatty liver disease, HCC, mets to liver  - check AFP  - CT abd/pelvis ordered. Will f/u results

## 2018-10-16 NOTE — ASSESSMENT & PLAN NOTE
- Seen on CXR and CT chest  - CT chest noting Focal region of opacity in the left lower lobe with irregular margins, suspicious for primary neoplasm of the lung, numerous soft tissue nodules in the bilateral lungs.  Pleural thickening and calcification as well as left trace pleural fluid  - With long history of smoking, suspect primary malignancy or mets  - IR biopsy of lung tomorrow

## 2018-10-16 NOTE — ASSESSMENT & PLAN NOTE
- Seen on CXR and CT chest (official read pending)  - With long history of smoking, suspect primary malignancy or mets  - will likely need biopsy of nodule

## 2018-10-16 NOTE — ASSESSMENT & PLAN NOTE
- Patient presented to ED with hypotension and 2/4 SIRS (HR>100, WBC>12) with suspected abdominal source  - qSOFA score of 2  - Lactic acic 3.7 on admit. Improved to 1.7 after 1 L NS  - UA neg, CXR with possible right lung nodule  - s/p vanc and zosyn in ED  - Blood cultures pending   - Give another 500 NS bolus  - Continue zosyn for now  - F/U CT c/a/p results  - Perform diagnostic paracentesis  -

## 2018-10-16 NOTE — H&P
[facility] Hospital Medicine  History & Physical    Patient Name: Madhu Grant  MRN: [id number]  Admission Date: 10/15/2018  Attending Physician: Tl Campbell MD   Primary Care Provider: EL Sheridan MD    [facility] Medicine Team: [facility] 3 Mannie Maloney MD     Patient information was obtained from patient, spouse/SO and past medical records.     Subjective:     Principal Problem:Sepsis    Chief Complaint:   Chief Complaint   Patient presents with    Fatigue     sent from im clinic with low blood pressure        HPI: 62 yo male with pmh of chronic back pain, HTN, HLD, hepatic steatosis, and tobacco abuse who presented to ED from PCP for hypotension and fatigue. Blood pressure at PCP office was noted to be 50/30 for which he was urgently sent to ED.. Patient states that he has been feeling fatigued and lethargic for the past 2 weeks due which he had poor appetite and weight loss. He was also noted to be jaundiced. Patient's wife states that patient's symptoms started closer to 6 months ago and have progressed. Patient has also exhibited slowed mentation and confusion over the past 2 weeks. Patient endorses constant dull frontal headache with photophobia and notes that he work up with blurred vision this morning. He endorses nausea and had 2 episodes of vomiting yesterday. he denies any fever/chills, abdominal pain, diarrhea, dysuria rashes.     In ED, patient's BP was 78/48 with HR in low 100s. Lactic acid elevated to 3.7. He was given 1 L NS bolus with improvement in BP to 102/59. Critical care was consulted and recommended admission to floor.    Past Medical History:   Diagnosis Date    Chronic back pain     Diabetes mellitus, type 2     Hepatic steatosis     Hyperlipidemia     Hypertension     PVD (peripheral vascular disease)     Tobacco use disorder        Past Surgical History:   Procedure Laterality Date    left hand      TONSILLECTOMY      UMBILICAL HERNIA REPAIR          Review of patient's allergies indicates:   Allergen Reactions    Iodine Hives    Latex, natural rubber Hives, Itching and Edema    Venom-honey bee Hives, Itching and Edema       No current facility-administered medications on file prior to encounter.      Current Outpatient Medications on File Prior to Encounter   Medication Sig    aspirin (ECOTRIN) 81 MG EC tablet Take 81 mg by mouth once daily.    gabapentin (NEURONTIN) 300 MG capsule TAKE TWO CAPSULES (600 MG TOTAL)BY MOUTH THREE TIMES DAILY    HYDROcodone-acetaminophen (NORCO)  mg per tablet TAKE ONE TABLET BY MOUTH EVERY 4 TO 6 HOURS AS NEEDED FOR PAIN    lisinopril (PRINIVIL,ZESTRIL) 20 MG tablet TAKE ONE TABLET (20 MG TOTAL) BY MOUTH ONCE DAILY    pravastatin (PRAVACHOL) 40 MG tablet TAKE ONE TABLET BY MOUTH ONCE DAILY IN THE EVENING    cyclobenzaprine (FLEXERIL) 10 MG tablet TAKE 1 TABLET BY MOUTH THREE TIMES DAILY    [DISCONTINUED] DIPHENHYDRAMINE HCL (BENADRYL ALLERGY ORAL) Take by mouth.    [DISCONTINUED] lidocaine 4 % Gel Apply topically.      [DISCONTINUED] lidocaine HCL 2% (XYLOCAINE) 2 % jelly APPLY   TOPICALLY TO AFFECTED AREA TWICE DAILY TO THREE TIMES DAILY    [DISCONTINUED] zolpidem (AMBIEN) 10 mg Tab TAKE ONE TABLET BY MOUTH AT BEDTIME AS NEEDED     Family History     Problem Relation (Age of Onset)    Lung cancer Father    Stroke Brother, Paternal Grandfather        Tobacco Use    Smoking status: Current Every Day Smoker    Smokeless tobacco: Current User   Substance and Sexual Activity    Alcohol use: Yes    Drug use: No    Sexual activity: Yes     Review of Systems   Constitutional: Positive for fatigue. Negative for chills and fever.   HENT: Negative for trouble swallowing.    Eyes: Positive for visual disturbance.   Respiratory: Negative for cough and shortness of breath.    Cardiovascular: Negative for chest pain, palpitations and leg swelling.   Gastrointestinal: Positive for abdominal distention, nausea and  vomiting. Negative for abdominal pain and diarrhea.   Genitourinary: Negative for dysuria and hematuria.   Musculoskeletal: Positive for arthralgias and back pain.   Skin: Positive for color change.   Neurological: Positive for weakness and headaches. Negative for syncope and speech difficulty.     Objective:     Vital Signs (Most Recent):  Temp: 98 °F (36.7 °C) (10/15/18 0945)  Pulse: 92 (10/15/18 2241)  Resp: 17 (10/15/18 1548)  BP: 104/63 (10/15/18 2241)  SpO2: 95 % (10/15/18 2241) Vital Signs (24h Range):  Temp:  [97.6 °F (36.4 °C)-98 °F (36.7 °C)] 98 °F (36.7 °C)  Pulse:  [] 92  Resp:  [17-18] 17  SpO2:  [94 %-97 %] 95 %  BP: ()/(45-97) 104/63     Weight: 76.6 kg (168 lb 14 oz)  Body mass index is 27.46 kg/m².    Physical Exam   Constitutional: He is oriented to person, place, and time. He appears well-developed and well-nourished.   HENT:   Head: Normocephalic and atraumatic.   Right Ear: External ear normal.   Left Ear: External ear normal.   Eyes: Scleral icterus is present.   Neck: Neck supple.   Cardiovascular: Normal rate, regular rhythm, normal heart sounds and intact distal pulses.   Pulmonary/Chest: Effort normal and breath sounds normal.   Abdominal: Soft. He exhibits distension. There is no tenderness.   Hepatomegaly  +fluid wave    Musculoskeletal: He exhibits no edema.   Neurological: He is alert and oriented to person, place, and time.   Slowed mentation    Skin: Skin is warm and dry.   jaundiced   Psychiatric: He has a normal mood and affect. His behavior is normal.           Significant Labs: All pertinent labs within the past 24 hours have been reviewed.    Significant Imaging: I have reviewed all pertinent imaging results/findings within the past 24 hours.    Assessment/Plan:     * Sepsis    - Patient presented to ED with hypotension and 2/4 SIRS (HR>100, WBC>12) with suspected abdominal source  - qSOFA score of 2  - Lactic acic 3.7 on admit. Improved to 1.7 after 1 L NS  - UA neg,  CXR with possible right lung nodule  - s/p vanc and zosyn in ED  - Blood cultures pending   - Give another 500 NS bolus  - Continue zosyn for now  - F/U CT c/a/p results  - Perform diagnostic paracentesis  -        Tension type headache    - patient presents with constant dull frontal headache  - suspect that this is a tension headache from recent hypotension and reduced po intake  - due to diplopia this morning, slowed mentation, and suspected malignancy (lung), will order CT head  - tylenol prn           Chronic back pain    Will hold gabapentin due to confusion/delirium           Hyperbilirubinemia    Ddx includes gallstones, malignancy, infection   CT abd/pelvis ordered. F/u official reading   Abd US ordered          Transaminitis    - Patient with known hepatic steatosis who presents with jaundice  - Ddx includes acute hepatitis, gallstones, mets to liver, HCC, drug-induced  - No significant history of alcohol use  - No excessive tylenol use  - CT abd/pelvis non-con ordered. Will f/u results  - Check acute hepatitis  - Check AFP  - Hold statin         ELEANOR (acute kidney injury)    - Cr 1.8 on admit, baseline 0.9  - likely pre-renal from hypotension  - s/p 1 L NS   - will given additional 500cc NS bolus   - Check urine studies  - renally dose medications   - Hold lisinopril           Hyperkalemia    Likely due to ELEANOR   S/p IV fluid  Repeat labs in AM           Leukocytosis    Suspect either due to infection or malignancy  See sepsis           Pulmonary nodules/lesions, multiple    - Seen on CXR and CT chest (official read pending)  - With long history of smoking, suspect primary malignancy or mets  - will likely need biopsy of nodule             Hepatomegaly    - DDx includes fatty liver disease, HCC, mets to liver  - check AFP  - CT abd/pelvis ordered. Will f/u results        Elevated lactic acid level    Due to hypotension   Improved after IV fluids          Hypotension    Likely due to poor PO intake with  underlying infection   Improved after fluid resuscitation   Hold lisinopril             VTE Risk Mitigation (From admission, onward)        Ordered     enoxaparin injection 40 mg  Daily      10/15/18 1912     IP VTE LOW RISK PATIENT  Once      10/15/18 1724             Mannie Maloney MD  Department of Hospital Medicine   [facility]

## 2018-10-16 NOTE — ASSESSMENT & PLAN NOTE
gabapentin held on admission due to confusion/delirium   CT C/A/P revealing T12 lytic bone lesion concerning for metastatic disease

## 2018-10-16 NOTE — ED NOTES
Telemetry Verification   Patient placed on Telemetry Box  Verified with War Room  Box # [id number]   Monitor Tech Johanny    Rate 90   Rhythm Normal Sinus

## 2018-10-16 NOTE — ASSESSMENT & PLAN NOTE
Likely due to poor PO intake with possible underlying infection   Improved after fluid resuscitation   Hold lisinopril

## 2018-10-16 NOTE — ASSESSMENT & PLAN NOTE
Likely due to poor PO intake with underlying infection   Improved after fluid resuscitation   Hold lisinopril

## 2018-10-16 NOTE — ASSESSMENT & PLAN NOTE
- Cr 1.8 on admit, baseline 0.9  - likely pre-renal from hypotension  -  Improved to 1.5 s/p 1.5 L NS    - Check urine studies  - renally dose medications   - Hold lisinopril

## 2018-10-16 NOTE — ASSESSMENT & PLAN NOTE
Ddx includes gallstones, malignancy, infection   CT abd/pelvis ordered. F/u official reading   Abd US ordered

## 2018-10-17 PROBLEM — R79.89 ELEVATED LACTIC ACID LEVEL: Status: RESOLVED | Noted: 2018-10-15 | Resolved: 2018-10-17

## 2018-10-17 PROBLEM — E87.5 HYPERKALEMIA: Status: RESOLVED | Noted: 2018-10-15 | Resolved: 2018-10-17

## 2018-10-17 LAB
ALBUMIN SERPL BCP-MCNC: 1.8 G/DL
ALP SERPL-CCNC: 519 U/L
ALT SERPL W/O P-5'-P-CCNC: 32 U/L
ANION GAP SERPL CALC-SCNC: 13 MMOL/L
AST SERPL-CCNC: 97 U/L
BASOPHILS # BLD AUTO: 0.02 K/UL
BASOPHILS NFR BLD: 0.1 %
BILIRUB SERPL-MCNC: 2.6 MG/DL
BUN SERPL-MCNC: 34 MG/DL
CALCIUM SERPL-MCNC: 8.9 MG/DL
CHLORIDE SERPL-SCNC: 103 MMOL/L
CO2 SERPL-SCNC: 19 MMOL/L
CREAT SERPL-MCNC: 1.3 MG/DL
DIFFERENTIAL METHOD: ABNORMAL
EOSINOPHIL # BLD AUTO: 0 K/UL
EOSINOPHIL NFR BLD: 0 %
ERYTHROCYTE [DISTWIDTH] IN BLOOD BY AUTOMATED COUNT: 21 %
EST. GFR  (AFRICAN AMERICAN): >60 ML/MIN/1.73 M^2
EST. GFR  (NON AFRICAN AMERICAN): 58.5 ML/MIN/1.73 M^2
FERRITIN SERPL-MCNC: 379 NG/ML
GLUCOSE SERPL-MCNC: 100 MG/DL
HCT VFR BLD AUTO: 38.8 %
HGB BLD-MCNC: 12.5 G/DL
HIV 1+2 AB+HIV1 P24 AG SERPL QL IA: NEGATIVE
IMM GRANULOCYTES # BLD AUTO: 0.09 K/UL
IMM GRANULOCYTES NFR BLD AUTO: 0.6 %
LYMPHOCYTES # BLD AUTO: 0.3 K/UL
LYMPHOCYTES NFR BLD: 2.3 %
MAGNESIUM SERPL-MCNC: 1.3 MG/DL
MCH RBC QN AUTO: 28.9 PG
MCHC RBC AUTO-ENTMCNC: 32.2 G/DL
MCV RBC AUTO: 90 FL
MONOCYTES # BLD AUTO: 0.4 K/UL
MONOCYTES NFR BLD: 2.9 %
NEUTROPHILS # BLD AUTO: 13.6 K/UL
NEUTROPHILS NFR BLD: 94.1 %
NRBC BLD-RTO: 0 /100 WBC
PLATELET # BLD AUTO: 195 K/UL
PMV BLD AUTO: 11.3 FL
POTASSIUM SERPL-SCNC: 4.1 MMOL/L
PROT SERPL-MCNC: 6.9 G/DL
RBC # BLD AUTO: 4.33 M/UL
SODIUM SERPL-SCNC: 135 MMOL/L
WBC # BLD AUTO: 14.48 K/UL

## 2018-10-17 PROCEDURE — 25000003 PHARM REV CODE 250: Performed by: STUDENT IN AN ORGANIZED HEALTH CARE EDUCATION/TRAINING PROGRAM

## 2018-10-17 PROCEDURE — G8978 MOBILITY CURRENT STATUS: HCPCS | Mod: CJ

## 2018-10-17 PROCEDURE — 63600175 PHARM REV CODE 636 W HCPCS: Performed by: INTERNAL MEDICINE

## 2018-10-17 PROCEDURE — 85025 COMPLETE CBC W/AUTO DIFF WBC: CPT

## 2018-10-17 PROCEDURE — G8979 MOBILITY GOAL STATUS: HCPCS | Mod: CI

## 2018-10-17 PROCEDURE — 83735 ASSAY OF MAGNESIUM: CPT

## 2018-10-17 PROCEDURE — 11000001 HC ACUTE MED/SURG PRIVATE ROOM

## 2018-10-17 PROCEDURE — 97162 PT EVAL MOD COMPLEX 30 MIN: CPT

## 2018-10-17 PROCEDURE — 25500020 PHARM REV CODE 255: Performed by: INTERNAL MEDICINE

## 2018-10-17 PROCEDURE — 99232 SBSQ HOSP IP/OBS MODERATE 35: CPT | Mod: ,,, | Performed by: INTERNAL MEDICINE

## 2018-10-17 PROCEDURE — 82728 ASSAY OF FERRITIN: CPT

## 2018-10-17 PROCEDURE — 25000003 PHARM REV CODE 250: Performed by: INTERNAL MEDICINE

## 2018-10-17 PROCEDURE — 80053 COMPREHEN METABOLIC PANEL: CPT

## 2018-10-17 PROCEDURE — 36415 COLL VENOUS BLD VENIPUNCTURE: CPT

## 2018-10-17 PROCEDURE — 63600175 PHARM REV CODE 636 W HCPCS: Performed by: STUDENT IN AN ORGANIZED HEALTH CARE EDUCATION/TRAINING PROGRAM

## 2018-10-17 PROCEDURE — 99222 1ST HOSP IP/OBS MODERATE 55: CPT | Mod: ,,, | Performed by: INTERNAL MEDICINE

## 2018-10-17 RX ORDER — CEFTRIAXONE 1 G/1
1 INJECTION, POWDER, FOR SOLUTION INTRAMUSCULAR; INTRAVENOUS
Status: DISCONTINUED | OUTPATIENT
Start: 2018-10-17 | End: 2018-10-18

## 2018-10-17 RX ORDER — LORAZEPAM/0.9% SODIUM CHLORIDE 100MG/0.1L
2 PLASTIC BAG, INJECTION (ML) INTRAVENOUS ONCE
Status: COMPLETED | OUTPATIENT
Start: 2018-10-17 | End: 2018-10-17

## 2018-10-17 RX ORDER — RAMELTEON 8 MG/1
8 TABLET ORAL NIGHTLY PRN
Status: DISCONTINUED | OUTPATIENT
Start: 2018-10-17 | End: 2018-10-18 | Stop reason: HOSPADM

## 2018-10-17 RX ADMIN — DIPHENHYDRAMINE HYDROCHLORIDE 50 MG: 25 CAPSULE ORAL at 12:10

## 2018-10-17 RX ADMIN — IOHEXOL 100 ML: 350 INJECTION, SOLUTION INTRAVENOUS at 02:10

## 2018-10-17 RX ADMIN — ONDANSETRON 8 MG: 4 TABLET, ORALLY DISINTEGRATING ORAL at 05:10

## 2018-10-17 RX ADMIN — RAMELTEON 8 MG: 8 TABLET, FILM COATED ORAL at 09:10

## 2018-10-17 RX ADMIN — SODIUM CHLORIDE 500 ML: 0.9 INJECTION, SOLUTION INTRAVENOUS at 11:10

## 2018-10-17 RX ADMIN — METRONIDAZOLE 500 MG: 500 TABLET ORAL at 09:10

## 2018-10-17 RX ADMIN — PREDNISONE 50 MG: 20 TABLET ORAL at 12:10

## 2018-10-17 RX ADMIN — CEFTRIAXONE SODIUM 1 G: 1 INJECTION, POWDER, FOR SOLUTION INTRAMUSCULAR; INTRAVENOUS at 09:10

## 2018-10-17 RX ADMIN — MAGNESIUM SULFATE IN WATER 2 G: 40 INJECTION, SOLUTION INTRAVENOUS at 02:10

## 2018-10-17 RX ADMIN — PREDNISONE 50 MG: 20 TABLET ORAL at 05:10

## 2018-10-17 RX ADMIN — METRONIDAZOLE 500 MG: 500 TABLET ORAL at 05:10

## 2018-10-17 RX ADMIN — METRONIDAZOLE 500 MG: 500 TABLET ORAL at 02:10

## 2018-10-17 NOTE — ASSESSMENT & PLAN NOTE
- Seen on CXR and CT chest  - CT chest noting Focal region of opacity in the left lower lobe with irregular margins, suspicious for primary neoplasm of the lung, numerous soft tissue nodules in the bilateral lungs.  Pleural thickening and calcification as well as left trace pleural fluid  - With long history of smoking, suspect primary malignancy or mets  - Will request biopsy of lung or lung

## 2018-10-17 NOTE — ASSESSMENT & PLAN NOTE
Diagnostic paracentesis 10/15 showed SAAG 1.0, no organisms seen on Gram Stain, culture pending  In setting of new findings on CT concerning for Lung primary with abdominal metastases  Follow-up cytology

## 2018-10-17 NOTE — PROGRESS NOTES
[facility] Hospital Medicine  Progress Note    Patient Name: Madhu Grant  MRN: [id number]  Patient Class: IP- Inpatient   Admission Date: 10/15/2018  Length of Stay: 2 days  Attending Physician: Alejandra Jones MD  Primary Care Provider: EL Sheridan MD    [facility] Medicine Team: [facility] Mannie Maloney MD    Subjective:     Principal Problem:Sepsis    HPI:  62 yo male with pmh of chronic back pain, HTN, HLD, hepatic steatosis, and tobacco abuse who presented to ED from PCP for hypotension and fatigue. Blood pressure at PCP office was noted to be 50/30 for which he was urgently sent to ED.. Patient states that he has been feeling fatigued and lethargic for the past 2 weeks due which he had poor appetite and weight loss. He was also noted to be jaundiced. Patient's wife states that patient's symptoms started closer to 6 months ago and have progressed. Patient has also exhibited slowed mentation and confusion over the past 2 weeks. Patient endorses constant dull frontal headache with photophobia and notes that he work up with blurred vision this morning. He endorses nausea and had 2 episodes of vomiting yesterday. he denies any fever/chills, abdominal pain, diarrhea, dysuria rashes.     In ED, patient's BP was 78/48 with HR in low 100s. Lactic acid elevated to 3.7. He was given 1 L NS bolus with improvement in BP to 102/59. Critical care was consulted and recommended admission to floor.    Hospital Course:  10/16/2018 No acute events overnight. Pt noting symptoms similar to previous episodes of GERD  10/17/2018 Patient prepped overnight for iodine allergy. Plan for CT triple phase today.       Interval History: patient did not sleep well overnight. Prepped for iodine allergy. CT triple phase today     Review of Systems   Constitutional: Positive for fatigue. Negative for chills and fever.   HENT: Negative for trouble swallowing.    Eyes: Positive for visual disturbance.   Respiratory:  Negative for cough and shortness of breath.    Cardiovascular: Negative for chest pain, palpitations and leg swelling.   Gastrointestinal: Positive for abdominal distention and nausea. Negative for abdominal pain, diarrhea and vomiting.   Genitourinary: Negative for dysuria and hematuria.   Musculoskeletal: Positive for arthralgias and back pain.   Skin: Positive for color change.   Neurological: Positive for weakness and headaches. Negative for syncope and speech difficulty.     Objective:     Vital Signs (Most Recent):  Temp: 96.3 °F (35.7 °C) (10/17/18 1200)  Pulse: 84 (10/17/18 1200)  Resp: 20 (10/17/18 1200)  BP: 103/63 (10/17/18 1200)  SpO2: 95 % (10/17/18 1200) Vital Signs (24h Range):  Temp:  [96.3 °F (35.7 °C)-98.7 °F (37.1 °C)] 96.3 °F (35.7 °C)  Pulse:  [84-93] 84  Resp:  [16-20] 20  SpO2:  [92 %-97 %] 95 %  BP: ()/(57-76) 103/63     Weight: 76.6 kg (168 lb 14 oz)  Body mass index is 27.46 kg/m².    Intake/Output Summary (Last 24 hours) at 10/17/2018 1543  Last data filed at 10/17/2018 0700  Gross per 24 hour   Intake --   Output 300 ml   Net -300 ml      Physical Exam   Constitutional: He is oriented to person, place, and time. He appears well-developed and well-nourished.   HENT:   Head: Normocephalic and atraumatic.   Right Ear: External ear normal.   Left Ear: External ear normal.   Eyes: Scleral icterus is present.   Neck: Neck supple.   Cardiovascular: Normal rate, regular rhythm, normal heart sounds and intact distal pulses.   Pulmonary/Chest: Effort normal and breath sounds normal.   Abdominal: Soft. He exhibits distension. There is hepatomegaly. There is no tenderness.   Hepatomegaly  +fluid wave    Musculoskeletal: He exhibits no edema.   Neurological: He is alert and oriented to person, place, and time.   Slowed mentation    Skin: Skin is warm and dry.   jaundiced   Psychiatric: He has a normal mood and affect. His behavior is normal.       Significant Labs: All pertinent labs within the  past 24 hours have been reviewed.    Significant Imaging: I have reviewed all pertinent imaging results/findings within the past 24 hours.    Assessment/Plan:      * Sepsis    -Patient presented to ED with hypotension and 2/4 SIRS (HR>100, WBC>12) with suspected abdominal source  -SIRS 1/4 today (WBC 12)  -qSOFA score of 2 on admission, 0 today  -Lactic acic 3.7 on admit. Improved to 1.7 after 1 L NS  -UA neg, CXR with possible right lung nodule  -vanc and zosyn in ED, Abx deescalated to Ceftriaxone/Metronidazole 10/16  -Blood cultures NGTD  -CT concerning for primary lung malignancy with lung and abdominal metastases  -diagnostic paracentesis showing SAAG 1.0, No organisms seen on gram stain, cultures pending  - If cultures remain negative, will stop abx        Malignant ascites    Diagnostic paracentesis 10/15 showed SAAG 1.0, no organisms seen on Gram Stain, culture pending  In setting of new findings on CT concerning for Lung primary with abdominal metastases  Follow-up cytology          Tension type headache    - patient presents with constant dull frontal headache  - suspect that this is a tension headache from recent hypotension and reduced po intake  - due to diplopia, slowed mentation, and suspected malignancy (lung), head CT obtained showing no acute intracranial abnormalities  - tylenol prn           Chronic back pain    gabapentin held on admission due to confusion/delirium   CT C/A/P revealing T12 lytic bone lesion concerning for metastatic disease          Hyperbilirubinemia    Ddx includes gallstones, malignancy, infection   CT showing a calcified gallstone with no wall thickening or pericholecystic fluid and an showing enlarged and heterogeneous liver concerning for underlying liver disease. Hepatology consulted, appreciate recommendations  Abd US showing enlarged liver with heterogeneous echotexture and nodular contour suggesting cirrhosis        Pleural plaque    See pulmonary nodules/lesions           Transaminitis    - Patient with known hepatic steatosis who presents with jaundice  - Ddx includes acute hepatitis, gallstones, mets to liver, HCC, drug-induced  - No significant history of alcohol use  - No excessive tylenol use  - CT abd/pelvis showing considerable mesenteric edema as well as peritoneal disease and some small mesenteric nodules, enlarged liver concerning for hepatic disease, and T-12 lytic lesion concerning all concerning for metastatic neoplasias   - Hepatitis antibodies negative  - AFP 0.8  - Hold statin   - Hepatology consulted  - Plan for CT triple phase today         ELEANOR (acute kidney injury)    - Cr 1.8 on admit, baseline 0.9  - likely pre-renal from hypotension  -  Cr Improved to 1.3 today   - renally dose medications   - Hold lisinopril           Leukocytosis    Suspect either due to infection or malignancy  See sepsis           Pulmonary nodules/lesions, multiple    - Seen on CXR and CT chest  - CT chest noting Focal region of opacity in the left lower lobe with irregular margins, suspicious for primary neoplasm of the lung, numerous soft tissue nodules in the bilateral lungs.  Pleural thickening and calcification as well as left trace pleural fluid  - With long history of smoking, suspect primary malignancy or mets  - Will request biopsy of lung or lung             Hepatomegaly    - DDx includes fatty liver disease, HCC, mets to liver  - AFP 0.9, Hep A, B, C antibodies negative  - CT abd/pelvis showing enlarged and heterogeneous liver concerning for underlying liver disease.   - Plan for CT triple phase today  - Hepatology consulted, appreciate recommendations        Hypotension    Likely due to poor PO intake with possible underlying infection   Improved after fluid resuscitation   Hold lisinopril             VTE Risk Mitigation (From admission, onward)        Ordered     enoxaparin injection 40 mg  Daily      10/15/18 1912     IP VTE LOW RISK PATIENT  Once      10/15/18 1724               Mannie Maloney MD  Department of Hospital Medicine   [facility]

## 2018-10-17 NOTE — TREATMENT PLAN
Treatment Plan  10/17/2018  11:40 AM    Obtain ferritin.  Follow up cytology from ascites as well as CT triple phase.  May possibly consider biopsy pending results above.  Full consult note to follow.    Glynn Poon M.D.  Gastroenterology Fellow, PGY-V  Pager: [phone]  [facility]

## 2018-10-17 NOTE — ASSESSMENT & PLAN NOTE
63 year old male current smoker with a history of HTN and HLD on who Hepatology is being consulted for elevated LFT's in order to help discern if this is due to underlying liver disease/cirrhosis or malignancy. After speaking to patient he denies prior history of liver disease although he has hepatic steatosis listed under diagnosis. Based on his LFT's, SAAG of 1.3, and imaging he could likely have cirrhosis +/- malignancy. From our perspective would recommend following up the cytology from the ascites as well as his CT abdomen triple phase. We may consider liver biopsy pending imaging.    Recommendations:  --Daily CMP, CBC, and INR  --Follow up cytology on the ascites fluid  --Follow up CT abdomen triple phase  --Obtain ferritin

## 2018-10-17 NOTE — CONSULTS
[facility]  Hepatology  Consult Note    Patient Name: Madhu Grant  MRN: [id number]  Admission Date: 10/15/2018  Hospital Length of Stay: 2 days  Attending Provider: Alejandra Jones MD   Primary Care Physician: EL Sheridan MD  Principal Problem:Sepsis    Inpatient consult to Hepatology  Consult performed by: Glynn Poon MD  Consult ordered by: Mannie Maloney MD        Subjective:     Transplant status: No    HPI:  63 year old male current smoker with a history of HTN and HLD on who hepatology is being consulted for elevated LFT's in order to help discern if this is due to underlying liver disease or malignancy.    Patient provide minimal history. Patient presented to the ED from the PCP for hypotension. He was admitted for sepsis, received IV fluids and antibiotics.   He then had a CT chest/abdomen/pelvis which was suspicious for possibly malignancy with mets based on liver/lung/bone findings. Follow up US did show a cirrhotic liver with no evidence of a discrete mass. He denies IVDU but does have prior drug test which were positive for opiates/marijuana. He denies heavy alcohol use but reports having shots of scotch/Dr. Pepper intermittently.Denies personal or family history of liver diease. Chart does report prior history of hepatic steatosis but no prior Hepatology visit, fibro-scan, or biopsy.       Review of Systems    Past Medical History:   Diagnosis Date    Chronic back pain     Diabetes mellitus, type 2     Hepatic steatosis     Hyperlipidemia     Hypertension     PVD (peripheral vascular disease)     Tobacco use disorder        Past Surgical History:   Procedure Laterality Date    left hand      TONSILLECTOMY      UMBILICAL HERNIA REPAIR         Family history of liver disease: No    Review of patient's allergies indicates:   Allergen Reactions    Iodine Hives    Latex, natural rubber Hives, Itching and Edema    Venom-honey bee Hives, Itching and Edema        Tobacco Use    Smoking status: Current Every Day Smoker    Smokeless tobacco: Current User   Substance and Sexual Activity    Alcohol use: Yes    Drug use: No    Sexual activity: Yes       Medications Prior to Admission   Medication Sig Dispense Refill Last Dose    aspirin (ECOTRIN) 81 MG EC tablet Take 81 mg by mouth once daily.   10/14/2018    diphenhydrAMINE (BENADRYL) 25 mg capsule Take 25 mg by mouth daily as needed for Itching.       gabapentin (NEURONTIN) 300 MG capsule Take 300 mg by mouth once daily.       HYDROcodone-acetaminophen (NORCO)  mg per tablet TAKE ONE TABLET BY MOUTH EVERY 4 TO 6 HOURS AS NEEDED FOR PAIN 90 tablet 0 10/14/2018    lisinopril (PRINIVIL,ZESTRIL) 20 MG tablet TAKE ONE TABLET (20 MG TOTAL) BY MOUTH ONCE DAILY 30 tablet 6 10/14/2018    pravastatin (PRAVACHOL) 40 MG tablet TAKE ONE TABLET BY MOUTH ONCE DAILY IN THE EVENING 30 tablet 6 10/14/2018       Objective:     Vital Signs (Most Recent):  Temp: 96.3 °F (35.7 °C) (10/17/18 1200)  Pulse: 84 (10/17/18 1200)  Resp: 20 (10/17/18 1200)  BP: 103/63 (10/17/18 1200)  SpO2: 95 % (10/17/18 1200) Vital Signs (24h Range):  Temp:  [96.3 °F (35.7 °C)-98.7 °F (37.1 °C)] 96.3 °F (35.7 °C)  Pulse:  [84-93] 84  Resp:  [16-20] 20  SpO2:  [92 %-97 %] 95 %  BP: ()/(57-76) 103/63     Weight: 76.6 kg (168 lb 14 oz) (10/15/18 0945)  Body mass index is 27.46 kg/m².    Physical Exam   Constitutional: He is oriented to person, place, and time. No distress.   HENT:   Head: Normocephalic and atraumatic.   Eyes: Scleral icterus is present.   Cardiovascular: Normal rate and regular rhythm.   Pulmonary/Chest: Effort normal and breath sounds normal.   Abdominal: Bowel sounds are normal. He exhibits distension. He exhibits no mass. There is no tenderness. There is no rebound and no guarding. No hernia.   Musculoskeletal: He exhibits edema (trace).   Neurological: He is alert and oriented to person, place, and time.   Skin: He is not  diaphoretic.       MELD-Na score: 19 at 10/17/2018  5:45 AM  MELD score: 17 at 10/17/2018  5:45 AM  Calculated from:  Serum Creatinine: 1.3 mg/dL at 10/17/2018  5:45 AM  Serum Sodium: 135 mmol/L at 10/17/2018  5:45 AM  Total Bilirubin: 2.6 mg/dL at 10/17/2018  5:45 AM  INR(ratio): 1.5 at 10/15/2018 10:05 AM  Age: 63 years    Significant Labs:  CBC:   Recent Labs   Lab  10/17/18   0545   WBC  14.48*   RBC  4.33*   HGB  12.5*   HCT  38.8*   PLT  195     CMP:   Recent Labs   Lab  10/17/18   0545   GLU  100   CALCIUM  8.9   ALBUMIN  1.8*   PROT  6.9   NA  135*   K  4.1   CO2  19*   CL  103   BUN  34*   CREATININE  1.3   ALKPHOS  519*   ALT  32   AST  97*   BILITOT  2.6*     Coagulation:   Recent Labs   Lab  10/15/18   1005   INR  1.5*       Significant Imaging:  US: Reviewed  CT: Reviewed    Assessment/Plan:     Transaminitis    63 year old male current smoker with a history of HTN and HLD on who Hepatology is being consulted for elevated LFT's in order to help discern if this is due to underlying liver disease/cirrhosis or malignancy. After speaking to patient he denies prior history of liver disease although he has hepatic steatosis listed under diagnosis. Based on his LFT's, SAAG of 1.3, and imaging he could likely have cirrhosis +/- malignancy. From our perspective would recommend following up the cytology from the ascites as well as his CT abdomen triple phase. We may consider liver biopsy pending imaging.    Recommendations:  --Daily CMP, CBC, and INR  --Follow up cytology on the ascites fluid  --Follow up CT abdomen triple phase  --Obtain ferritin               Thank you for your consult. I will follow-up with patient. Please contact us if you have any additional questions.    Glynn Poon M.D.  Gastroenterology Fellow, PGY-V  Pager: [phone]  [facility]

## 2018-10-17 NOTE — HPI
63 year old male current smoker with a history of HTN and HLD on who hepatology is being consulted for elevated LFT's in order to help discern if this is due to underlying liver disease or malignancy.    Patient provide minimal history. Patient presented to the ED from the PCP for hypotension. He was admitted for sepsis, received IV fluids and antibiotics.   He then had a CT chest/abdomen/pelvis which was suspicious for possibly malignancy with mets based on liver/lung/bone findings. Follow up US did show a cirrhotic liver with no evidence of a discrete mass. He denies IVDU but does have prior drug test which were positive for opiates/marijuana. He denies heavy alcohol use but reports having shots of scotch/Dr. Pepper intermittently.Denies personal or family history of liver diease. Chart does report prior history of hepatic steatosis but no prior Hepatology visit, fibro-scan, or biopsy.

## 2018-10-17 NOTE — PLAN OF CARE
Problem: Patient Care Overview  Goal: Plan of Care Review  Outcome: Ongoing (interventions implemented as appropriate)  Plan of care reviewed. Pt lying in bed Sleeping after returning from CT scan. Plan is to biopsy Liver. No sign of distress noted. Will cont to monitor

## 2018-10-17 NOTE — PT/OT/SLP EVAL
"Physical Therapy Evaluation    Patient Name:  Madhu Grant   MRN:  [id number]    Recommendations:     Discharge Recommendations:  home with home health   Discharge Equipment Recommendations: none   Barriers to discharge: Decreased caregiver support    Assessment:     Madhu Grant is a 63 y.o. male admitted with a medical diagnosis of Sepsis.  He presents with the following impairments/functional limitations:  gait instability, impaired endurance, impaired sensation, impaired balance, weakness. He demonstrates minimal instability c/ standing activity, requiring increased use of UE on RW for stability, he is unsafe to ambulate without RW at this time. Prior to admit, he did not use any AD in the home or community, but likely exhibited instability in gait. Current state of unsteadiness likely exacerbated by current medical condition. Patient would benefit from acute skilled PT services to address these deficits and reach maximum level of function.      Rehab Prognosis:  Good;    Recent Surgery: * No surgery found *      Plan:     During this hospitalization, patient to be seen 3 x/week to address the above listed problems via gait training, therapeutic activities, therapeutic exercises, neuromuscular re-education  · Plan of Care Expires:  11/16/18   Plan of Care Reviewed with: patient    Subjective     Communicated with nsg prior to session.  Patient found supine upon PT entry to room, agreeable to evaluation.      Chief Complaint: "don't like being confined and there is stuff wrapped all over me"  Patient comments/goals: would like to get a job  Pain/Comfort:  · Pain Rating 1: 0/10    Patients cultural, spiritual, Yarsanism conflicts given the current situation: none stated    Living Environment:  Pt lives with wife in one story home, c/1 ANUPAMA without rails. Wife works outside the home during the day.  Prior to admission, patients level of function was independent. He does not currently work or drive, though reports " being able to do both. He also is able to ambulate in the community, but participates a limited amount. He reports one fall in the previous month, d/t acute weakness in legs. Patient has the following equipment: walker, rolling, cane, straight, raised toilet. Upon discharge, patient will have assistance from wife part of the time.    Objective:     Patient found with: telemetry, peripheral IV     General Precautions: Standard, fall   Orthopedic Precautions:N/A   Braces: N/A     Exams:  · Cognitive Exam:  Patient is oriented to Person, Place, Time and Situation  · Postural Exam:  Patient presented with the following abnormalities:    · -       Rounded shoulders  · -       Fwd trunk bend  · Sensation: Intact; pt reports numbness in both legs at times.  · RLE ROM: WFL  · RLE Strength: WFL  · LLE ROM: WFL  · LLE Strength: WFL    Functional Mobility:  · Bed Mobility:     · Rolling Left:  independence  · Supine to Sit: stand-by assist  · Sit to Supine: stand-by assist  · Transfers:     · Sit to Stand:  contact guard assistance with rolling walker  · Gait: Pt ambulated 10' c/RW and CGA x2 trials. He exhibited increased reliance on UE c/RW for stability and increased fatigue c/activity.    Balance:   · Seated balance: sudden posterior trunk lean/LOB occurred immediately upon stand to sit transfer.  · Static standing balance: min A for balance during cleaning following toileting.    AM-PAC 6 CLICK MOBILITY  Total Score:20       Therapeutic Activities and Exercises:   PT evaluation completed, POC initiated and discussed with patient.  Pt required toilet break for bowel movement, required assistance for cleaning following activity - 10 min    Pt safe to ambulate c/RW and 1 person assist.    Patient left left sidelying with call button in reach.    GOALS:   Multidisciplinary Problems     Physical Therapy Goals        Problem: Physical Therapy Goal    Goal Priority Disciplines Outcome Goal Variances Interventions   Physical  Therapy Goal     PT, PT/OT      Description:  Goals to be met by: 10/27/18     Patient will increase functional independence with mobility by performin. Sit to stand transfer with Supervision  2. Gait  x 150 feet with Stand-by Assistance using Rolling Walker.   3. Ascend/descend 1 curb with Supervision using Rolling Walker.   4. Lower extremity exercise program x15 reps per handout, with independence                      History:     Past Medical History:   Diagnosis Date    Chronic back pain     Diabetes mellitus, type 2     Hepatic steatosis     Hyperlipidemia     Hypertension     PVD (peripheral vascular disease)     Tobacco use disorder        Past Surgical History:   Procedure Laterality Date    left hand      TONSILLECTOMY      UMBILICAL HERNIA REPAIR         Clinical Decision Making:     History  Co-morbidities and personal factors that may impact the plan of care Examination  Body Structures and Functions, activity limitations and participation restrictions that may impact the plan of care Clinical Presentation   Decision Making/ Complexity Score   Co-morbidities:   [] Time since onset of injury / illness / exacerbation  [x] Status of current condition  []Patient's cognitive status and safety concerns    [] Multiple Medical Problems (see med hx)  Personal Factors:   [] Patient's age  [x] Prior Level of function   [] Patient's home situation (environment and family support)  [] Patient's level of motivation  [] Expected progression of patient      HISTORY:(criteria)    [] 71144 - no personal factors/history    [x] 91958 - has 1-2 personal factor/comorbidity     [] 80605 - has >3 personal factor/comorbidity     Body Regions:  [] Objective examination findings  [] Head     []  Neck  [x] Trunk   [] Upper Extremity  [x] Lower Extremity    Body Systems:  [x] For communication ability, affect, cognition, language, and learning style: the assessment of the ability to make needs known, consciousness,  orientation (person, place, and time), expected emotional /behavioral responses, and learning preferences (eg, learning barriers, education  needs)  [x] For the neuromuscular system: a general assessment of gross coordinated movement (eg, balance, gait, locomotion, transfers, and transitions) and motor function  (motor control and motor learning)  [] For the musculoskeletal system: the assessment of gross symmetry, gross range of motion, gross strength, height, and weight  [] For the integumentary system: the assessment of pliability(texture), presence of scar formation, skin color, and skin integrity  [] For cardiovascular/pulmonary system: the assessment of heart rate, respiratory rate, blood pressure, and edema     Activity limitations:    [] Patient's cognitive status and saf ety concerns          [] Status of current condition      [] Weight bearing restriction  [] Cardiopulmunary Restriction    Participation Restrictions:   [] Goals and goal agreement with the patient     [] Rehab potential (prognosis) and probable outcome      Examination of Body System: (criteria)    [] 03506 - addressing 1-2 elements    [] 14256 - addressing a total of 3 or more elements     [x] 85716 -  Addressing a total of 4 or more elements         Clinical Presentation: (criteria)  Evolving - 77192     On examination of body system using standardized tests and measures patient presents with 4 or more elements from any of the following: body structures and functions, activity limitations, and/or participation restrictions.  Leading to a clinical presentation that is considered evolving with changing characteristics                              Clinical Decision Making  (Eval Complexity):  Moderate - 56513     Time Tracking:     PT Received On: 10/17/18  PT Start Time: 1006     PT Stop Time: 1033  PT Total Time (min): 27 min     Billable Minutes: Evaluation 17 min and Therapeutic Activity 10 min      Olamide Sosa, SPT  10/17/2018

## 2018-10-17 NOTE — PLAN OF CARE
Problem: Physical Therapy Goal  Goal: Physical Therapy Goal  Goals to be met by: 10/27/18     Patient will increase functional independence with mobility by performin. Sit to stand transfer with Supervision  2. Gait  x 150 feet with Stand-by Assistance using Rolling Walker.   3. Ascend/descend 1 curb with Supervision using Rolling Walker.   4. Lower extremity exercise program x15 reps per handout, with independence     Outcome: Ongoing (interventions implemented as appropriate)  PT evaluation completed, POC initiated    Olamide Sosa, SPT  10/17/18

## 2018-10-17 NOTE — ASSESSMENT & PLAN NOTE
- DDx includes fatty liver disease, HCC, mets to liver  - AFP 0.9, Hep A, B, C antibodies negative  - CT abd/pelvis showing enlarged and heterogeneous liver concerning for underlying liver disease.   - Plan for CT triple phase today  - Hepatology consulted, appreciate recommendations

## 2018-10-17 NOTE — ASSESSMENT & PLAN NOTE
-Patient presented to ED with hypotension and 2/4 SIRS (HR>100, WBC>12) with suspected abdominal source  -SIRS 1/4 today (WBC 12)  -qSOFA score of 2 on admission, 0 today  -Lactic acic 3.7 on admit. Improved to 1.7 after 1 L NS  -UA neg, CXR with possible right lung nodule  -vanc and zosyn in ED, Abx deescalated to Ceftriaxone/Metronidazole 10/16  -Blood cultures NGTD  -CT concerning for primary lung malignancy with lung and abdominal metastases  -diagnostic paracentesis showing SAAG 1.0, No organisms seen on gram stain, cultures pending  - If cultures remain negative, will stop abx

## 2018-10-17 NOTE — ASSESSMENT & PLAN NOTE
- Cr 1.8 on admit, baseline 0.9  - likely pre-renal from hypotension  -  Cr Improved to 1.3 today   - renally dose medications   - Hold lisinopril

## 2018-10-17 NOTE — SUBJECTIVE & OBJECTIVE
Interval History: patient did not sleep well overnight. Prepped for iodine allergy. CT triple phase today     Review of Systems   Constitutional: Positive for fatigue. Negative for chills and fever.   HENT: Negative for trouble swallowing.    Eyes: Positive for visual disturbance.   Respiratory: Negative for cough and shortness of breath.    Cardiovascular: Negative for chest pain, palpitations and leg swelling.   Gastrointestinal: Positive for abdominal distention and nausea. Negative for abdominal pain, diarrhea and vomiting.   Genitourinary: Negative for dysuria and hematuria.   Musculoskeletal: Positive for arthralgias and back pain.   Skin: Positive for color change.   Neurological: Positive for weakness and headaches. Negative for syncope and speech difficulty.     Objective:     Vital Signs (Most Recent):  Temp: 96.3 °F (35.7 °C) (10/17/18 1200)  Pulse: 84 (10/17/18 1200)  Resp: 20 (10/17/18 1200)  BP: 103/63 (10/17/18 1200)  SpO2: 95 % (10/17/18 1200) Vital Signs (24h Range):  Temp:  [96.3 °F (35.7 °C)-98.7 °F (37.1 °C)] 96.3 °F (35.7 °C)  Pulse:  [84-93] 84  Resp:  [16-20] 20  SpO2:  [92 %-97 %] 95 %  BP: ()/(57-76) 103/63     Weight: 76.6 kg (168 lb 14 oz)  Body mass index is 27.46 kg/m².    Intake/Output Summary (Last 24 hours) at 10/17/2018 1543  Last data filed at 10/17/2018 0700  Gross per 24 hour   Intake --   Output 300 ml   Net -300 ml      Physical Exam   Constitutional: He is oriented to person, place, and time. He appears well-developed and well-nourished.   HENT:   Head: Normocephalic and atraumatic.   Right Ear: External ear normal.   Left Ear: External ear normal.   Eyes: Scleral icterus is present.   Neck: Neck supple.   Cardiovascular: Normal rate, regular rhythm, normal heart sounds and intact distal pulses.   Pulmonary/Chest: Effort normal and breath sounds normal.   Abdominal: Soft. He exhibits distension. There is hepatomegaly. There is no tenderness.   Hepatomegaly  +fluid wave     Musculoskeletal: He exhibits no edema.   Neurological: He is alert and oriented to person, place, and time.   Slowed mentation    Skin: Skin is warm and dry.   jaundiced   Psychiatric: He has a normal mood and affect. His behavior is normal.       Significant Labs: All pertinent labs within the past 24 hours have been reviewed.    Significant Imaging: I have reviewed all pertinent imaging results/findings within the past 24 hours.

## 2018-10-17 NOTE — H&P
Inpatient Radiology Pre-procedure Note    History of Present Illness:  Madhu Grant is a 63 y.o. male who presents for image guided paracentesis and liver biopsy.  Admission H&P reviewed.  Past Medical History:   Diagnosis Date    Chronic back pain     Diabetes mellitus, type 2     Hepatic steatosis     Hyperlipidemia     Hypertension     PVD (peripheral vascular disease)     Tobacco use disorder      Past Surgical History:   Procedure Laterality Date    left hand      TONSILLECTOMY      UMBILICAL HERNIA REPAIR         Review of Systems:   As documented in primary team H&P    Home Meds:   Prior to Admission medications    Medication Sig Start Date End Date Taking? Authorizing Provider   aspirin (ECOTRIN) 81 MG EC tablet Take 81 mg by mouth once daily.   Yes Historical Provider, MD   diphenhydrAMINE (BENADRYL) 25 mg capsule Take 25 mg by mouth daily as needed for Itching.   Yes Historical Provider, MD   gabapentin (NEURONTIN) 300 MG capsule Take 300 mg by mouth once daily.   Yes Historical Provider, MD   HYDROcodone-acetaminophen (NORCO)  mg per tablet TAKE ONE TABLET BY MOUTH EVERY 4 TO 6 HOURS AS NEEDED FOR PAIN 9/25/18  Yes ANDREW Sheridan MD   lisinopril (PRINIVIL,ZESTRIL) 20 MG tablet TAKE ONE TABLET (20 MG TOTAL) BY MOUTH ONCE DAILY 5/24/18  Yes ANDREW Sheridan MD   pravastatin (PRAVACHOL) 40 MG tablet TAKE ONE TABLET BY MOUTH ONCE DAILY IN THE EVENING 4/27/18  Yes ANDREW Sheridan MD     Scheduled Meds:    cefTRIAXone (ROCEPHIN) IVPB  1 g Intravenous Q24H    enoxaparin  40 mg Subcutaneous Daily    metroNIDAZOLE  500 mg Oral Q8H    pantoprazole  40 mg Oral Daily     Continuous Infusions:   PRN Meds:acetaminophen, loperamide, ondansetron  Anticoagulants/Antiplatelets: no anticoagulation    Allergies:   Review of patient's allergies indicates:   Allergen Reactions    Iodine Hives    Latex, natural rubber Hives, Itching and Edema    Venom-honey bee Hives, Itching and Edema     Sedation  Hx: have not been any systemic reactions    Labs:  Recent Labs   Lab  10/15/18   1005   INR  1.5*       Recent Labs   Lab  10/17/18   0545   WBC  14.48*   HGB  12.5*   HCT  38.8*   MCV  90   PLT  195      Recent Labs   Lab  10/17/18   0545   GLU  100   NA  135*   K  4.1   CL  103   CO2  19*   BUN  34*   CREATININE  1.3   CALCIUM  8.9   MG  1.3*   ALT  32   AST  97*   ALBUMIN  1.8*   BILITOT  2.6*         Vitals:  Temp: 96.2 °F (35.7 °C) (10/17/18 1608)  Pulse: 84 (10/17/18 1608)  Resp: 20 (10/17/18 1608)  BP: (!) 118/59 (10/17/18 1608)  SpO2: 98 % (10/17/18 1608)     Physical Exam:  ASA: 3  Mallampati: 2    General: no acute distress  Mental Status: alert and oriented to person, place and time  HEENT: normocephalic, atraumatic  Chest: unlabored breathing  Heart: regular heart rate  Abdomen: nondistended  Extremity: moves all extremities        Plan:   Image guided paracentesis followed by liver biopsy.  Sedation Plan: Moderate.    Casey Edmond  Radiology R4

## 2018-10-17 NOTE — ASSESSMENT & PLAN NOTE
- Patient with known hepatic steatosis who presents with jaundice  - Ddx includes acute hepatitis, gallstones, mets to liver, HCC, drug-induced  - No significant history of alcohol use  - No excessive tylenol use  - CT abd/pelvis showing considerable mesenteric edema as well as peritoneal disease and some small mesenteric nodules, enlarged liver concerning for hepatic disease, and T-12 lytic lesion concerning all concerning for metastatic neoplasias   - Hepatitis antibodies negative  - AFP 0.8  - Hold statin   - Hepatology consulted  - Plan for CT triple phase today

## 2018-10-18 VITALS
WEIGHT: 168.88 LBS | DIASTOLIC BLOOD PRESSURE: 59 MMHG | BODY MASS INDEX: 27.14 KG/M2 | HEIGHT: 66 IN | RESPIRATION RATE: 18 BRPM | SYSTOLIC BLOOD PRESSURE: 86 MMHG | TEMPERATURE: 97 F | OXYGEN SATURATION: 96 % | HEART RATE: 94 BPM

## 2018-10-18 PROBLEM — A41.9 SEPSIS: Status: RESOLVED | Noted: 2018-10-15 | Resolved: 2018-10-18

## 2018-10-18 PROBLEM — K63.89 MASS OF HEPATIC FLEXURE OF COLON: Status: ACTIVE | Noted: 2018-10-18

## 2018-10-18 PROBLEM — R18.8 OTHER ASCITES: Status: ACTIVE | Noted: 2018-10-16

## 2018-10-18 PROBLEM — S22.080A T12 COMPRESSION FRACTURE: Status: ACTIVE | Noted: 2018-10-18

## 2018-10-18 LAB
ALBUMIN SERPL BCP-MCNC: 1.8 G/DL
ALP SERPL-CCNC: 517 U/L
ALT SERPL W/O P-5'-P-CCNC: 33 U/L
ANION GAP SERPL CALC-SCNC: 13 MMOL/L
AST SERPL-CCNC: 99 U/L
BASOPHILS # BLD AUTO: 0.01 K/UL
BASOPHILS NFR BLD: 0.1 %
BILIRUB SERPL-MCNC: 2.1 MG/DL
BUN SERPL-MCNC: 35 MG/DL
CALCIUM SERPL-MCNC: 9.2 MG/DL
CHLORIDE SERPL-SCNC: 103 MMOL/L
CO2 SERPL-SCNC: 18 MMOL/L
CREAT SERPL-MCNC: 1.2 MG/DL
DIFFERENTIAL METHOD: ABNORMAL
EOSINOPHIL # BLD AUTO: 0 K/UL
EOSINOPHIL NFR BLD: 0.1 %
ERYTHROCYTE [DISTWIDTH] IN BLOOD BY AUTOMATED COUNT: 20.6 %
EST. GFR  (AFRICAN AMERICAN): >60 ML/MIN/1.73 M^2
EST. GFR  (NON AFRICAN AMERICAN): >60 ML/MIN/1.73 M^2
GLUCOSE SERPL-MCNC: 117 MG/DL
HCT VFR BLD AUTO: 36.4 %
HGB BLD-MCNC: 12.3 G/DL
IMM GRANULOCYTES # BLD AUTO: 0.11 K/UL
IMM GRANULOCYTES NFR BLD AUTO: 0.7 %
INR PPP: 1.7
LYMPHOCYTES # BLD AUTO: 0.5 K/UL
LYMPHOCYTES NFR BLD: 2.8 %
MAGNESIUM SERPL-MCNC: 1.6 MG/DL
MCH RBC QN AUTO: 29.6 PG
MCHC RBC AUTO-ENTMCNC: 33.8 G/DL
MCV RBC AUTO: 88 FL
MONOCYTES # BLD AUTO: 1.3 K/UL
MONOCYTES NFR BLD: 8.1 %
NEUTROPHILS # BLD AUTO: 14.7 K/UL
NEUTROPHILS NFR BLD: 88.2 %
NRBC BLD-RTO: 0 /100 WBC
PLATELET # BLD AUTO: 200 K/UL
PMV BLD AUTO: 10.8 FL
POCT GLUCOSE: 206 MG/DL (ref 70–110)
POTASSIUM SERPL-SCNC: 3.7 MMOL/L
PROT SERPL-MCNC: 6.4 G/DL
PROTHROMBIN TIME: 17.1 SEC
RBC # BLD AUTO: 4.15 M/UL
SODIUM SERPL-SCNC: 134 MMOL/L
WBC # BLD AUTO: 16.59 K/UL

## 2018-10-18 PROCEDURE — 99239 HOSP IP/OBS DSCHRG MGMT >30: CPT | Mod: ,,, | Performed by: INTERNAL MEDICINE

## 2018-10-18 PROCEDURE — 36415 COLL VENOUS BLD VENIPUNCTURE: CPT

## 2018-10-18 PROCEDURE — 80053 COMPREHEN METABOLIC PANEL: CPT

## 2018-10-18 PROCEDURE — 0W9G3ZX DRAINAGE OF PERITONEAL CAVITY, PERCUTANEOUS APPROACH, DIAGNOSTIC: ICD-10-PCS | Performed by: RADIOLOGY

## 2018-10-18 PROCEDURE — 97530 THERAPEUTIC ACTIVITIES: CPT

## 2018-10-18 PROCEDURE — 25000003 PHARM REV CODE 250: Performed by: STUDENT IN AN ORGANIZED HEALTH CARE EDUCATION/TRAINING PROGRAM

## 2018-10-18 PROCEDURE — 99255 IP/OBS CONSLTJ NEW/EST HI 80: CPT | Mod: ,,, | Performed by: INTERNAL MEDICINE

## 2018-10-18 PROCEDURE — 85025 COMPLETE CBC W/AUTO DIFF WBC: CPT

## 2018-10-18 PROCEDURE — 97535 SELF CARE MNGMENT TRAINING: CPT

## 2018-10-18 PROCEDURE — 85610 PROTHROMBIN TIME: CPT

## 2018-10-18 PROCEDURE — 83735 ASSAY OF MAGNESIUM: CPT

## 2018-10-18 RX ORDER — LANOLIN ALCOHOL/MO/W.PET/CERES
400 CREAM (GRAM) TOPICAL DAILY
Status: DISCONTINUED | OUTPATIENT
Start: 2018-10-18 | End: 2018-10-18 | Stop reason: HOSPADM

## 2018-10-18 RX ADMIN — PANTOPRAZOLE SODIUM 40 MG: 40 TABLET, DELAYED RELEASE ORAL at 09:10

## 2018-10-18 RX ADMIN — METRONIDAZOLE 500 MG: 500 TABLET ORAL at 06:10

## 2018-10-18 RX ADMIN — MAGNESIUM OXIDE TAB 400 MG (241.3 MG ELEMENTAL MG) 400 MG: 400 (241.3 MG) TAB at 09:10

## 2018-10-18 NOTE — PLAN OF CARE
Problem: Occupational Therapy Goal  Goal: Occupational Therapy Goal  Goals to be met by: 10/23/2018    Patient will increase functional independence with ADLs by performing:    UE Dressing with Contact Guard Assistance.  LE Dressing with Contact Guard Assistance. -- Met for shorts (10/18)  Grooming while standing with Contact Guard Assistance.  Toileting from toilet with Contact Guard Assistance for hygiene and clothing management.   Toilet transfer to toilet with Contact Guard Assistance.  Pt will perform functional task while standing for three minutes with no instances of LOB.     Outcome: Ongoing (interventions implemented as appropriate)  Pt continues to work with therapy towards functional outcomes    Comments: Cont OT POC

## 2018-10-18 NOTE — ASSESSMENT & PLAN NOTE
- Patient with known hepatic steatosis who presents with jaundice  - Ddx includes acute hepatitis, gallstones, mets to liver, HCC, drug-induced  - No significant history of alcohol use  - No excessive tylenol use  - CT abd/pelvis showing considerable mesenteric edema as well as peritoneal disease and some small mesenteric nodules, enlarged liver concerning for hepatic disease, and T-12 lytic lesion concerning all concerning for metastatic neoplasias   - Hepatitis antibodies negative  - AFP 0.8  - Hold statin   - Hepatology consulted  - CT abdomen suggesting widespread malignancy throughout most of the liver

## 2018-10-18 NOTE — ASSESSMENT & PLAN NOTE
Multiple pulmonary nodules with extensive heterogeneity of liver as well as soft tissue nodules in upper abdomen concerning for metastatic disease with peritoneal carcinomatosis. Social history behavior also suggestive of primary lung cancer as he had been smoking since 14 years old and has pleural plaques most consistent with asbestos exposure.  Differentials include colon primary with colon enhancement near the hepatic flexure. Differentials also include two separate primaries (lung and GI). 101/5/18 Ascitic fluid cytology negative.  Has had CT non-contrast without obvious evidence of cranial metastases but visual symptoms still concerning.  ECOG 2-3 right now.  PT has evaluated him and recommended home health.  -Agree with primary team for liver biopsy  --If GI origin, may consider separate biopsy of lung nodules as outpatient  --If lung origin, may recommend outpatient colonoscopy (patient has never had one) to further evaluate hepatic flexure.  -If visual symptoms persist, may need further evaluation with MRI brain with contrast  -Told patient results take about 1 week and further treatment planning will be discussed with him when he follows up in clinic after we get all the results.  -Core biopsies preferred as we suspect lung primary and will need sufficient tissue to test for BRAF, EGFR, ALK, ROS1, PDL1  -Recommend evaluation by ortho spine for pathologic compression fracture of T12 as his functional status needs to be optimized for any kind of treatment.

## 2018-10-18 NOTE — TREATMENT PLAN
Treatment Plan  10/18/2018  6:22 PM    Chart reviewed and case discussed with attending.  CT abdomen TP with widespread malignancy throughout most of the liver therefore questions of fibrosis/cirrhosis trumped by extent of tumor burden.  Ascitic fluid cytology negative for malignancy. Agree with liver biopsy as well as Heme-Onc Consult. We will follow from the periphery, please call with any additional questions/concerns.    Glynn Poon M.D.  Gastroenterology Fellow, PGY-V  Pager: [phone]  [facility]

## 2018-10-18 NOTE — ASSESSMENT & PLAN NOTE
Diagnostic paracentesis 10/15 showed SAAG 1.0, no organisms seen on Gram Stain, culture pending  In setting of new findings on CT concerning for Lung primary with abdominal metastases  Follow-up cytology  5L of ascitic fluid drawn off by IR today; patient was unable to be safely biopsied due to additional ascitic fluid. Pt scheduled for outpatient IR paracentesis and US liver biopsy in one week.

## 2018-10-18 NOTE — PT/OT/SLP PROGRESS
"Occupational Therapy   Treatment    Name: Madhu Grant  MRN: [id number]  Admitting Diagnosis:  Sepsis       Recommendations:     Discharge Recommendations: home with home health  Discharge Equipment Recommendations:  none  Barriers to discharge:  None    Subjective     Pt found L side lying stating, " I think I may have had a BM"    Communicated with: RN prior to session.  Pain/Comfort:  · Pain Rating 1: 0/10  · Pain Rating Post-Intervention 1: 0/10    Patients cultural, spiritual, Zoroastrian conflicts given the current situation: none stated     Objective:     Patient found with: telemetry    General Precautions: Standard, fall   Orthopedic Precautions:N/A   Braces: N/A     Occupational Performance:    Bed Mobility:    · Patient completed Supine to Sit with minimum assistance     Functional Mobility/Transfers:  · Patient completed Sit <> Stand Transfer with stand by assistance  with  no assistive device   · Patient completed Bed <> Chair Transfer using Step Transfer technique with stand by assistance with no assistive device  · Functional Mobility: Pt ambulated short household distance w/ SBA and no AD as pt only transferred to chair. Pt refused further ambulation distance 2/2 fatigue and SOB.     Activities of Daily Living:  · Lower Body Dressing: contact guard assistance to don shorts sitting EOB and Total to don B non skid socks while sitting EOB 2/2 SOB and inability to position B LEs in four point  · Toileting: total assistance jodi-hygiene in side lying    Patient left up in chair with all lines intact, call button in reach and RN notified    [facility] 6 Click: Self-care  [facility] Total Score: 21    Treatment & Education:  - OT POC  - Importance of OOB activity to maximize recovery   - Safety w/ functional mobility; hand placement to complete safe transfers  - LBD adaptive technique w/ four point positioning to don socks - pt unable to perform 2/2 fatigue   Education:    Assessment:     Madhu Grant is a 63 y.o. male " with a medical diagnosis of Sepsis.  He presents with the following performance deficits affecting function:  gait instability, impaired endurance, weakness, impaired self care skills, impaired balance, impaired functional mobilty.      Pt tolerated session well. Pt continues to be motivated to participate w/ therapy but was functionally limited this date 2/2 quick onset of fatigue and SOB w/ EOB self-care tasks. Pt able to don shorts sitting EOB but unable to don B non skid socks 2/2 SOB w/ bending over and too fatigued to perform 4 point positioning. Pt transferred to bedside chair w/ SBA for safety. He will continue to benefit from skilled OT to address the above listed impairments.     Rehab Prognosis:  Good; patient would benefit from acute skilled OT services to address these deficits and reach maximum level of function.       Plan:     Patient to be seen 4 x/week to address the above listed problems via self-care/home management, therapeutic activities, therapeutic exercises  · Plan of Care Expires: 11/15/18  · Plan of Care Reviewed with: patient    This Plan of care has been discussed with the patient who was involved in its development and understands and is in agreement with the identified goals and treatment plan    GOALS:   Multidisciplinary Problems     Occupational Therapy Goals        Problem: Occupational Therapy Goal    Goal Priority Disciplines Outcome Interventions   Occupational Therapy Goal     OT, PT/OT Ongoing (interventions implemented as appropriate)    Description:  Goals to be met by: 10/23/2018    Patient will increase functional independence with ADLs by performing:    UE Dressing with Contact Guard Assistance.  LE Dressing with Contact Guard Assistance. -- Met for shorts (10/18)  Grooming while standing with Contact Guard Assistance.  Toileting from toilet with Contact Guard Assistance for hygiene and clothing management.   Toilet transfer to toilet with Contact Guard Assistance.  Pt  will perform functional task while standing for three minutes with no instances of LOB.                       Time Tracking:     OT Date of Treatment: 10/18/18  OT Start Time: 1031  OT Stop Time: 1056  OT Total Time (min): 25 min    Billable Minutes:Self Care/Home Management 25    Coco Cassidy OT  10/18/2018

## 2018-10-18 NOTE — PLAN OF CARE
CM spoke to patient in regards to Home health recommendations, pt is agreeable to Home health. Pt submitted. SW will follow up on acceptance.

## 2018-10-18 NOTE — PROCEDURES
Radiology Post-Procedure Note    Pre Op Diagnosis: Ascites  Post Op Diagnosis: Same    Procedure: Ultrasound Guided Paracentesis    Procedure performed by: Caleb PEREA, Olamide     Written Informed Consent Obtained: Yes  Specimen Removed: YES clear yellow  Estimated Blood Loss: Minimal    Findings:   Successful paracentesis.  Albumin administered PRN per protocol.    Patient tolerated procedure well.    Olamide Ellis, APRN, FNP  Interventional Radiology  [phone] [employer]

## 2018-10-18 NOTE — ASSESSMENT & PLAN NOTE
CT abdomen with contrast noting probable nonobstructing colon mass near the hepatic flexure. Primary colon malignancy is a strong consideration.  IR biopsy of liver in one week  Pt will need outpatient colonoscopy, referral placed

## 2018-10-18 NOTE — ASSESSMENT & PLAN NOTE
- DDx includes fatty liver disease, HCC, mets to liver  - AFP 0.9, Hep A, B, C antibodies negative  - CT abd/pelvis showing enlarged and heterogeneous liver concerning for underlying liver disease.   - CT abd with contrast suggesting widespread malignancy throughout most of the liver

## 2018-10-18 NOTE — PHARMACY MED REC
"Admission Medication Reconciliation - Pharmacy Consult Note    The home medication history was taken by Yadira Gonzalez, Pharmacy Technician.  Based on information gathered and subsequent review by the clinical pharmacist, the items below may need attention.     You may go to "Admission" then "Reconcile Home Medications" tabs to review and/or act upon these items.     Potentially problematic discrepancies with current MAR  o Patient IS taking the following which was not ordered upon admit  o ASA 81 mg QD  o Gabapentin 600 mg QD  o Norco 10/325 mg Q 6 hr PRN   o Lisinopril 10 mg QD (held)   o Pravastatin 40 mg QD (held)    Please address this information as you see fit.  Feel free to contact us if you have any questions or require assistance.  Lizzeth Lehman  [phone]     .    .            "

## 2018-10-18 NOTE — ASSESSMENT & PLAN NOTE
- Seen on CXR and CT chest  - CT chest noting Focal region of opacity in the left lower lobe with irregular margins, suspicious for primary neoplasm of the lung, numerous soft tissue nodules in the bilateral lungs.  Pleural thickening and calcification as well as left trace pleural fluid  - With long history of smoking, suspect primary malignancy or mets  - CT abdomen with contrast notes multiple pulmonary nodules suggestive of pulmonary metastatic disease

## 2018-10-18 NOTE — HPI
"64 yo man with history of PAD with iliac stent, HLD, HTN, hepatic steatosis who presented to his PCP for generalized weakness and fatigue and was found to be hypotensive and sent to the ED.  In the ED he was empirically treated for sepsis given his leukocytosis, hypotension and ELEANOR.  He was resuscitated with IVF and started on antibiotics.  CT scan was done that showed pulmonary lesions concerning for primary lung malignancy, liver with marked heterogenity, ascites, non-specific soft tissue nodules possibly peritoneal metastases versus lymphadenopathy, and colon enhancement near the hepatic flexure.  Source of sepsis with these CT findings thought to be most likely abdominal.  He underwent a diagnostic paracentesis with cytology that was negative for malignant cells.  CT head was done without evidence of metastatic disease. Consulted heme onc for unknown primary.    Feels tired.  Denies fevers, chills, chest pain, shortness of breath, hemoptysis constipation, melena, or hematochezia.  Associated symptoms include "cross eyed vision" with improvement if he closes either eye and intermittent headaches.  Occasional nausea.  Abdominal distention has been going on for "years"  "

## 2018-10-18 NOTE — ASSESSMENT & PLAN NOTE
-Patient presented to ED with hypotension and 2/4 SIRS (HR>100, WBC>12) with suspected abdominal source  -SIRS 1/4 today (WBC 12)  -qSOFA score of 2 on admission, 0 today  -Lactic acic 3.7 on admit. Improved to 1.7 after 1 L NS  -UA neg, CXR with possible right lung nodule  -vanc and zosyn in ED, Abx deescalated to Ceftriaxone/Metronidazole 10/16  -Blood cultures NGTD  -CT concerning for primary lung malignancy with lung and abdominal metastases  -diagnostic paracentesis showing SAAG 1.0, No organisms seen on gram stain, cultures pending  -Cultures remain negative to date, antibiotics discontinued.

## 2018-10-18 NOTE — ASSESSMENT & PLAN NOTE
Ddx includes gallstones, malignancy, infection   CT showing a calcified gallstone with no wall thickening or pericholecystic fluid and an showing enlarged and heterogeneous liver concerning for underlying liver disease. Hepatology consulted, appreciate recommendations  Abd US showing enlarged liver with heterogeneous echotexture and nodular contour suggesting cirrhosis  CT with contrast suggesting widespread malignancy throughout most of the liver

## 2018-10-18 NOTE — CONSULTS
"[facility]  Hematology/Oncology  Consult Note    Patient Name: Madhu Grant  MRN: [id number]  Admission Date: 10/15/2018  Hospital Length of Stay: 3 days  Code Status: Full Code   Attending Provider: Alejandra Jones MD  Consulting Provider: Clinton Rice MD  Primary Care Physician: EL Sheridan MD  Principal Problem:Sepsis    Inpatient consult to Hematology/Oncology  Consult performed by: Clinton Rice MD  Consult ordered by: Mannie Maloney MD        Subjective:     HPI:  62 yo man with history of PAD with iliac stent, HLD, HTN, hepatic steatosis who presented to his PCP for generalized weakness and fatigue and was found to be hypotensive and sent to the ED.  In the ED he was empirically treated for sepsis given his leukocytosis, hypotension and ELEANOR.  He was resuscitated with IVF and started on antibiotics.  CT scan was done that showed pulmonary lesions concerning for primary lung malignancy, liver with marked heterogenity, ascites, non-specific soft tissue nodules possibly peritoneal metastases versus lymphadenopathy, and colon enhancement near the hepatic flexure.  Source of sepsis with these CT findings thought to be most likely abdominal.  He underwent a diagnostic paracentesis with cytology that was negative for malignant cells.  CT head was done without evidence of metastatic disease. Consulted heme onc for unknown primary.    Feels tired.  Denies fevers, chills, chest pain, shortness of breath, hemoptysis constipation, melena, or hematochezia.  Associated symptoms include "cross eyed vision" with improvement if he closes either eye and intermittent headaches.  Occasional nausea.  Abdominal distention has been going on for "years"    Oncology Treatment Plan:   [No treatment plan]    Medications:  Continuous Infusions:  Scheduled Meds:   magnesium oxide  400 mg Oral Daily    pantoprazole  40 mg Oral Daily     PRN Meds:acetaminophen, loperamide, ondansetron, ramelteon     Review of " patient's allergies indicates:   Allergen Reactions    Iodine Hives    Latex, natural rubber Hives, Itching and Edema    Venom-honey bee Hives, Itching and Edema        Past Medical History:   Diagnosis Date    Chronic back pain     Diabetes mellitus, type 2     Hepatic steatosis     Hyperlipidemia     Hypertension     PVD (peripheral vascular disease)     Tobacco use disorder      Past Surgical History:   Procedure Laterality Date    left hand      TONSILLECTOMY      UMBILICAL HERNIA REPAIR       Family History     Problem Relation (Age of Onset)    Lung cancer Father    Stroke Brother, Paternal Grandfather        Tobacco Use    Smoking status: Current Every Day Smoker    Smokeless tobacco: Current User   Substance and Sexual Activity    Alcohol use: Yes    Drug use: No    Sexual activity: Yes       Review of Systems   Constitutional: Positive for fatigue. Negative for chills and fever.   HENT: Negative for trouble swallowing.    Eyes: Positive for visual disturbance.   Respiratory: Negative for cough and shortness of breath.    Cardiovascular: Negative for chest pain, palpitations and leg swelling.   Gastrointestinal: Positive for abdominal distention and nausea. Negative for abdominal pain, diarrhea and vomiting.   Genitourinary: Negative for dysuria and hematuria.   Musculoskeletal: Positive for arthralgias and back pain.   Skin: Positive for color change.   Neurological: Positive for weakness and headaches. Negative for syncope and speech difficulty.     Objective:     Vital Signs (Most Recent):  Temp: 97.6 °F (36.4 °C) (10/18/18 0903)  Pulse: 80 (10/18/18 0903)  Resp: 18 (10/18/18 0903)  BP: (!) 101/54 (10/18/18 0903)  SpO2: 96 % (10/18/18 0903) Vital Signs (24h Range):  Temp:  [96.2 °F (35.7 °C)-97.8 °F (36.6 °C)] 97.6 °F (36.4 °C)  Pulse:  [80-86] 80  Resp:  [16-20] 18  SpO2:  [92 %-98 %] 96 %  BP: (101-118)/(54-71) 101/54     Weight: 76.6 kg (168 lb 14 oz)  Body mass index is 27.46  kg/m².  Body surface area is 1.89 meters squared.      Intake/Output Summary (Last 24 hours) at 10/18/2018 1143  Last data filed at 10/17/2018 2300  Gross per 24 hour   Intake --   Output 500 ml   Net -500 ml       Physical Exam   Constitutional: He is oriented to person, place, and time. He appears well-developed and well-nourished.   HENT:   Head: Normocephalic and atraumatic.   Right Ear: External ear normal.   Left Ear: External ear normal.   Eyes: No scleral icterus.   Neck: Neck supple.   Cardiovascular: Normal rate, regular rhythm, normal heart sounds and intact distal pulses.   Pulmonary/Chest: Effort normal and breath sounds normal.   Abdominal: Soft. He exhibits distension. There is hepatomegaly. There is no tenderness.   Hepatomegaly  +fluid wave    Musculoskeletal: He exhibits no edema.   Neurological: He is alert and oriented to person, place, and time.   Slowed mentation    Skin: Skin is warm and dry.   Psychiatric: He has a normal mood and affect. His behavior is normal.       Significant Labs:   CBC:   Recent Labs   Lab 10/17/18  0545 10/18/18  0359   WBC 14.48* 16.59*   HGB 12.5* 12.3*   HCT 38.8* 36.4*    200   , CMP:   Recent Labs   Lab 10/17/18  0545 10/18/18  0358   * 134*   K 4.1 3.7    103   CO2 19* 18*    117*   BUN 34* 35*   CREATININE 1.3 1.2   CALCIUM 8.9 9.2   PROT 6.9 6.4   ALBUMIN 1.8* 1.8*   BILITOT 2.6* 2.1*   ALKPHOS 519* 517*   AST 97* 99*   ALT 32 33   ANIONGAP 13 13   EGFRNONAA 58.5* >60.0    and Coagulation:   Recent Labs   Lab 10/18/18  1015   INR 1.7*       Diagnostic Results:  I have reviewed all pertinent imaging results/findings within the past 24 hours.    Assessment/Plan:     Pulmonary nodules/lesions, multiple    Multiple pulmonary nodules with extensive heterogeneity of liver as well as soft tissue nodules in upper abdomen concerning for metastatic disease with peritoneal carcinomatosis. Social history behavior also suggestive of primary lung  cancer as he had been smoking since 14 years old and has pleural plaques most consistent with asbestos exposure.  Differentials include colon primary with colon enhancement near the hepatic flexure. Differentials also include two separate primaries (lung and GI). 101/5/18 Ascitic fluid cytology negative.  Has had CT non-contrast without obvious evidence of cranial metastases but visual symptoms still concerning.  ECOG 2-3 right now.  PT has evaluated him and recommended home health.  -Agree with primary team for liver biopsy  --If GI origin, may consider separate biopsy of lung nodules as outpatient  --If lung origin, may recommend outpatient colonoscopy (patient has never had one) to further evaluate hepatic flexure.  -If visual symptoms persist, may need further evaluation with MRI brain with contrast  -Told patient results take about 1 week and further treatment planning will be discussed with him when he follows up in clinic after we get all the results.  -Core biopsies preferred as we suspect lung primary and will need sufficient tissue to test for BRAF, EGFR, ALK, ROS1, PDL1  -Recommend evaluation by ortho spine for pathologic compression fracture of T12 as his functional status needs to be optimized for any kind of treatment.           Thank you for your consult. I will follow-up with patient. Please contact us if you have any additional questions.    Clinton Rice MD  Hematology/Oncology  [facility]    Attending Note  Case discussed and agree with the fellow's note as stated above.  Obtain Pathology as above

## 2018-10-18 NOTE — NURSING
Discharge instructions given to patient and family.  Patient verbalized understanding and had no further questions.  Patient's IV, tele removed and vital signs within normal limits.   on-call paged for home health setup.  Patient now awaiting wheelchair for discharge.  Will continue to follow up.

## 2018-10-18 NOTE — ASSESSMENT & PLAN NOTE
- Cr 1.8 on admit, baseline 0.9  - likely pre-renal from hypotension  -  Cr Improved to 1.2 today   - renally dose medications   - Hold lisinopril

## 2018-10-18 NOTE — SUBJECTIVE & OBJECTIVE
Oncology Treatment Plan:   [No treatment plan]    Medications:  Continuous Infusions:  Scheduled Meds:   magnesium oxide  400 mg Oral Daily    pantoprazole  40 mg Oral Daily     PRN Meds:acetaminophen, loperamide, ondansetron, ramelteon     Review of patient's allergies indicates:   Allergen Reactions    Iodine Hives    Latex, natural rubber Hives, Itching and Edema    Venom-honey bee Hives, Itching and Edema        Past Medical History:   Diagnosis Date    Chronic back pain     Diabetes mellitus, type 2     Hepatic steatosis     Hyperlipidemia     Hypertension     PVD (peripheral vascular disease)     Tobacco use disorder      Past Surgical History:   Procedure Laterality Date    left hand      TONSILLECTOMY      UMBILICAL HERNIA REPAIR       Family History     Problem Relation (Age of Onset)    Lung cancer Father    Stroke Brother, Paternal Grandfather        Tobacco Use    Smoking status: Current Every Day Smoker    Smokeless tobacco: Current User   Substance and Sexual Activity    Alcohol use: Yes    Drug use: No    Sexual activity: Yes       Review of Systems   Constitutional: Positive for fatigue. Negative for chills and fever.   HENT: Negative for trouble swallowing.    Eyes: Positive for visual disturbance.   Respiratory: Negative for cough and shortness of breath.    Cardiovascular: Negative for chest pain, palpitations and leg swelling.   Gastrointestinal: Positive for abdominal distention and nausea. Negative for abdominal pain, diarrhea and vomiting.   Genitourinary: Negative for dysuria and hematuria.   Musculoskeletal: Positive for arthralgias and back pain.   Skin: Positive for color change.   Neurological: Positive for weakness and headaches. Negative for syncope and speech difficulty.     Objective:     Vital Signs (Most Recent):  Temp: 97.6 °F (36.4 °C) (10/18/18 0903)  Pulse: 80 (10/18/18 0903)  Resp: 18 (10/18/18 0903)  BP: (!) 101/54 (10/18/18 0903)  SpO2: 96 % (10/18/18  0903) Vital Signs (24h Range):  Temp:  [96.2 °F (35.7 °C)-97.8 °F (36.6 °C)] 97.6 °F (36.4 °C)  Pulse:  [80-86] 80  Resp:  [16-20] 18  SpO2:  [92 %-98 %] 96 %  BP: (101-118)/(54-71) 101/54     Weight: 76.6 kg (168 lb 14 oz)  Body mass index is 27.46 kg/m².  Body surface area is 1.89 meters squared.      Intake/Output Summary (Last 24 hours) at 10/18/2018 1143  Last data filed at 10/17/2018 2300  Gross per 24 hour   Intake --   Output 500 ml   Net -500 ml       Physical Exam   Constitutional: He is oriented to person, place, and time. He appears well-developed and well-nourished.   HENT:   Head: Normocephalic and atraumatic.   Right Ear: External ear normal.   Left Ear: External ear normal.   Eyes: No scleral icterus.   Neck: Neck supple.   Cardiovascular: Normal rate, regular rhythm, normal heart sounds and intact distal pulses.   Pulmonary/Chest: Effort normal and breath sounds normal.   Abdominal: Soft. He exhibits distension. There is hepatomegaly. There is no tenderness.   Hepatomegaly  +fluid wave    Musculoskeletal: He exhibits no edema.   Neurological: He is alert and oriented to person, place, and time.   Slowed mentation    Skin: Skin is warm and dry.   Psychiatric: He has a normal mood and affect. His behavior is normal.       Significant Labs:   CBC:   Recent Labs   Lab 10/17/18  0545 10/18/18  0359   WBC 14.48* 16.59*   HGB 12.5* 12.3*   HCT 38.8* 36.4*    200   , CMP:   Recent Labs   Lab 10/17/18  0545 10/18/18  0358   * 134*   K 4.1 3.7    103   CO2 19* 18*    117*   BUN 34* 35*   CREATININE 1.3 1.2   CALCIUM 8.9 9.2   PROT 6.9 6.4   ALBUMIN 1.8* 1.8*   BILITOT 2.6* 2.1*   ALKPHOS 519* 517*   AST 97* 99*   ALT 32 33   ANIONGAP 13 13   EGFRNONAA 58.5* >60.0    and Coagulation:   Recent Labs   Lab 10/18/18  1015   INR 1.7*       Diagnostic Results:  I have reviewed all pertinent imaging results/findings within the past 24 hours.

## 2018-10-19 ENCOUNTER — PATIENT OUTREACH (OUTPATIENT)
Dept: ADMINISTRATIVE | Facility: CLINIC | Age: 63
End: 2018-10-19

## 2018-10-19 ENCOUNTER — TELEPHONE (OUTPATIENT)
Dept: INTERNAL MEDICINE | Facility: CLINIC | Age: 63
End: 2018-10-19

## 2018-10-19 DIAGNOSIS — R91.8 PULMONARY NODULES/LESIONS, MULTIPLE: Primary | ICD-10-CM

## 2018-10-19 LAB — BACTERIA SPEC AEROBE CULT: NO GROWTH

## 2018-10-19 NOTE — PLAN OF CARE
On Call [facility];    Pg'd by nurse [id number], h/h orders are imputed in system.  Orders send through [facility] [email or web address] system to [facility] h/h.    Ambrocio Lee LMSW

## 2018-10-19 NOTE — ASSESSMENT & PLAN NOTE
Noted on CT chest, concerning for metastatic disease  Pt to follow up with Orthopedics as outpatient

## 2018-10-19 NOTE — TELEPHONE ENCOUNTER
----- Message from José Chaney LPN sent at 10/19/2018  3:10 PM CDT -----      ----- Message -----  From: Alejandra Jones MD  Sent: 10/18/2018   5:52 PM  To: [facility] Staff    Hey Dr. Sheridan-    Saw your patient Mr. Grant for likely metastatic colon ca.  We did liver bx and he has follow up with hem/onc next week.  Hoping you can get him into clinic in next 1-2 weeks for hospital follow up.  Let me know if questions.    Thanks,  Alejandra oJnes

## 2018-10-19 NOTE — PROGRESS NOTES
C3 nurse attempted to contact patient. No answer. The following message was left for the patient to return the call:  Good afternoon.  I am a nurse calling on behalf of [facility] from the [facility].  This is a Transitional Care Call for Madhu Grant. When you have a moment please contact us at [phone] or [phone] Monday through Friday, between the hours of 8 am to 4 pm. We look forward to speaking with you. On behalf of [facility] have a nice day.    The patient has a scheduled HOSFU appointment with EL Sheridan MD on 10/31/18 @ 5736. Message sent to Physician staff.

## 2018-10-19 NOTE — DISCHARGE SUMMARY
[facility] Hospital Medicine  Discharge Summary      Patient Name: Madhu Grant  MRN: [id number]  Admission Date: 10/15/2018  Hospital Length of Stay: 3 days  Discharge Date and Time:  10/18/2018 10:23 PM  Attending Physician: No att. providers found   Discharging Provider: Oniel Burns DO  Primary Care Provider: EL Sheridan MD    [facility] Medicine Team: [facility] HOSP MED 3 Oniel Burns DO    HPI: 64 yo male with pmh of chronic back pain, HTN, HLD, hepatic steatosis, and tobacco abuse who presented to ED from PCP for hypotension and fatigue. Blood pressure at PCP office was noted to be 50/30 for which he was urgently sent to ED.. Patient states that he has been feeling fatigued and lethargic for the past 2 weeks due which he had poor appetite and weight loss. He was also noted to be jaundiced. Patient's wife states that patient's symptoms started closer to 6 months ago and have progressed. Patient has also exhibited slowed mentation and confusion over the past 2 weeks. Patient endorses constant dull frontal headache with photophobia and notes that he work up with blurred vision this morning. He endorses nausea and had 2 episodes of vomiting yesterday. he denies any fever/chills, abdominal pain, diarrhea, dysuria rashes.      In ED, patient's BP was 78/48 with HR in low 100s. Lactic acid elevated to 3.7. He was given 1 L NS bolus with improvement in BP to 102/59. Critical care was consulted and recommended admission to floor  * No surgery found *      Hospital Course: 10/16/2018 No acute events overnight. Pt noting symptoms similar to previous episodes of GERD  10/17/2018 Patient prepped overnight for iodine allergy. Plan for CT triple phase today.   10/18/2018 Patient underwent IR paracentesis with 5L drained; unable to safely drain additional fluid or perform IR biopsy. Pt to be scheduled for outpatient IR paracentesis and biopsy in one week.    Physical Exam   Constitutional: He is  well-developed, well-nourished, and in no distress. No distress.   HENT:   Head: Normocephalic and atraumatic.   Mouth/Throat: Oropharynx is clear and moist.   Eyes: EOM are normal. Pupils are equal, round, and reactive to light. Scleral icterus is present.   Neck: Normal range of motion. Neck supple.   Cardiovascular: Normal rate, regular rhythm, normal heart sounds and intact distal pulses.   Pulmonary/Chest: Effort normal and breath sounds normal. No respiratory distress. He has no wheezes. He has no rales. He exhibits no tenderness.   Abdominal: He exhibits distension and mass. There is no tenderness. There is no rebound and no guarding.   Skin: Skin is warm and dry. No rash noted. He is not diaphoretic.     Problem List Items Addressed This Visit        Neuro    T12 compression fracture     Noted on CT chest, concerning for metastatic disease  Pt to follow up with Orthopedics as outpatient           Relevant Orders    Ambulatory Referral to Orthopedics       Pulmonary    Pulmonary nodules/lesions, multiple     Multiple pulmonary nodules with extensive heterogeneity of liver as well as soft tissue nodules in upper abdomen concerning for metastatic disease with peritoneal carcinomatosis. Social history behavior also suggestive of primary lung cancer as he had been smoking since 14 years old and has pleural plaques most consistent with asbestos exposure.  Differentials include colon primary with colon enhancement near the hepatic flexure. Differentials also include two separate primaries (lung and GI). 101/5/18 Ascitic fluid cytology negative.  Has had CT non-contrast without obvious evidence of cranial metastases but visual symptoms still concerning.  ECOG 2-3 right now.  PT has evaluated him and recommended home health.  --If GI origin, may consider separate biopsy of lung nodules as outpatient  --If lung origin, may recommend outpatient colonoscopy (patient has never had one) to further evaluate hepatic  flexure.  -If visual symptoms persist, may need further evaluation with MRI brain with contrast  -Told patient results take about 1 week and further treatment planning will be discussed with him when he follows up in clinic after we get all the results.  -Core biopsies preferred as we suspect lung primary and will need sufficient tissue to test for BRAF, EGFR, ALK, ROS1, PDL1  -Recommend evaluation by ortho spine for pathologic compression fracture of T12 as his functional status needs to be optimized for any kind of treatment.              Cardiac/Vascular    Hypotension     Likely due to poor PO intake  Improved after fluid resuscitation   Hold lisinopril            Tachycardia    Relevant Orders    X-Ray Chest PA And Lateral (Completed)       Renal/    ELEANOR (acute kidney injury)     - Cr 1.8 on admit, baseline 0.9  - likely pre-renal from hypotension  -  Cr Improved to 1.2 today   - renally dose medications   - Hold lisinopril            Hypomagnesemia       Oncology    Leukocytosis     Suspect either due to infection or malignancy  See sepsis               GI    Hepatomegaly     - DDx includes fatty liver disease, HCC, mets to liver  - AFP 0.9, Hep A, B, C antibodies negative  - CT abd/pelvis showing enlarged and heterogeneous liver concerning for underlying liver disease.   - CT abd with contrast suggesting widespread malignancy throughout most of the liver           Relevant Orders    US Biopsy Liver    Transaminitis     - Patient with known hepatic steatosis who presents with jaundice  - Ddx includes acute hepatitis, gallstones, mets to liver, HCC, drug-induced  - No significant history of alcohol use  - No excessive tylenol use  - CT abd/pelvis showing considerable mesenteric edema as well as peritoneal disease and some small mesenteric nodules, enlarged liver concerning for hepatic disease, and T-12 lytic lesion concerning all concerning for metastatic neoplasias   - Hepatitis antibodies negative  - AFP  0.8  - Hold statin   - Hepatology consulted  - CT abdomen suggesting widespread malignancy throughout most of the liver         * (Principal) Other ascites - Primary     Diagnostic paracentesis 10/15 showed SAAG 1.0, no organisms seen on Gram Stain, culture pending  In setting of new findings on CT concerning for Lung primary with abdominal metastases  Follow-up cytology  5L of ascitic fluid drawn off by IR today; patient was unable to be safely biopsied due to additional ascitic fluid. Pt scheduled for outpatient IR paracentesis and US liver biopsy in one week.           Mass of hepatic flexure of colon     CT abdomen with contrast noting probable nonobstructing colon mass near the hepatic flexure. Primary colon malignancy is a strong consideration.  IR biopsy of liver done while admitted  Pt will need outpatient colonoscopy, referral placed         Relevant Orders    Case request GI: COLONOSCOPY (Completed)    Ambulatory Referral to Hematology / Oncology    Jaundice    Relevant Orders    US Abdomen Complete (Completed)    Hepatic cirrhosis       Other    Hyperbilirubinemia     Ddx includes gallstones, malignancy, infection   CT showing a calcified gallstone with no wall thickening or pericholecystic fluid and an showing enlarged and heterogeneous liver concerning for underlying liver disease. Hepatology consulted, appreciate recommendations  Abd US showing enlarged liver with heterogeneous echotexture and nodular contour suggesting cirrhosis  CT with contrast suggesting widespread malignancy throughout most of the liver           Other Visit Diagnoses     Elevated lactic acid level        Hyperkalemia        Sepsis, due to unspecified organism            Consults:   Consults (From admission, onward)        Status Ordering Provider     Inpatient consult to Critical Care Medicine  Once     Provider:  (Not yet assigned)    Completed CHEO HALE     Inpatient consult to Hematology/Oncology  Once     Provider:   (Not yet assigned)    Completed TAYLA MACHADO     Inpatient consult to Hepatology  Once     Provider:  (Not yet assigned)    Completed TAYLA MACHADO          Final Active Diagnoses:    Diagnosis Date Noted POA    PRINCIPAL PROBLEM:  Other ascites [R18.8] 10/16/2018 Yes    Mass of hepatic flexure of colon [K63.9] 10/18/2018 Yes    T12 compression fracture [S22.080A] 10/18/2018 Unknown    Jaundice [R17]  Yes    Tachycardia [R00.0]  Yes    Hepatic cirrhosis [K74.60]  Yes    Hypomagnesemia [E83.42]  Yes    Hypotension [I95.9] 10/15/2018 Yes    Hepatomegaly [R16.0] 10/15/2018 Yes    Pulmonary nodules/lesions, multiple [R91.8] 10/15/2018 Yes    Leukocytosis [D72.829] 10/15/2018 Yes    ELEANOR (acute kidney injury) [N17.9] 10/15/2018 Yes    Transaminitis [R74.0] 10/15/2018 Yes    Pleural plaque [J92.9] 10/15/2018 Yes    Hyperbilirubinemia [E80.6] 10/15/2018 Yes    Chronic back pain [M54.9, G89.29] 10/15/2018 Yes    Tension type headache [G44.209] 10/15/2018 Yes      Problems Resolved During this Admission:    Diagnosis Date Noted Date Resolved POA    Elevated lactic acid level [R79.89] 10/15/2018 10/17/2018 Yes    Hyperkalemia [E87.5] 10/15/2018 10/17/2018 Yes    Sepsis [A41.9] 10/15/2018 10/18/2018 Yes      Discharged Condition: stable    Disposition: Home or Self Care    Follow Up:  [facility]     [facility]    Specialty:  [facility]  Contact information:  [address]  [address]  [facility] [id number] [phone]             P Bulmaro Sheridan MD In 1 week.    Specialty:  Internal Medicine  Contact information:  [address] [facility] [address]  [phone]                 Patient Instructions:      US Biopsy Liver   Standing Status: Future Standing Exp. Date: 10/18/19     Order Specific Question Answer Comments   May the Radiologist modify the order per protocol to meet the clinical needs of the patient? Yes      IR  Paracentesis without imaging   Standing Status: Future Standing Exp. Date: 10/18/19     Order Specific Question Answer Comments   Has the patient had a prior contrast or iodine reaction? No    Is the patient currently on any blood thinners like Aspirin, Coumadin, or Plavix? No    Is the patient on any Metformin drug, such as Glucophage or Glucovance? No    May the Radiologist modify the order per protocol to meet the clinical needs of the patient? Yes      Ambulatory Referral to Hematology / Oncology   Referral Priority: Routine Referral Type: Consultation   Referral Reason: Specialty Services Required   Requested Specialty: Hematology and Oncology   Number of Visits Requested: 1     Ambulatory Referral to Orthopedics   Referral Priority: Routine Referral Type: Consultation   Requested Specialty: Orthopedic Surgery   Number of Visits Requested: 1     Notify your health care provider if you experience any of the following:  temperature >100.4     Notify your health care provider if you experience any of the following:  persistent nausea and vomiting or diarrhea     Notify your health care provider if you experience any of the following:  severe uncontrolled pain     Notify your health care provider if you experience any of the following:  redness, tenderness, or signs of infection (pain, swelling, redness, odor or green/yellow discharge around incision site)     Notify your health care provider if you experience any of the following:  difficulty breathing or increased cough     Notify your health care provider if you experience any of the following:  severe persistent headache     Notify your health care provider if you experience any of the following:  worsening rash     Notify your health care provider if you experience any of the following:  persistent dizziness, light-headedness, or visual disturbances     Notify your health care provider if you experience any of the following:  increased confusion or weakness      Case request GI: COLONOSCOPY     Order Specific Question Answer Comments   Medical Necessity: Medically Urgent [101]    CPT Code: NC COLORECTAL SCRN; HI RISK IND []      Medications:  Reconciled Home Medications:      Medication List      CONTINUE taking these medications    BENADRYL 25 mg capsule  Generic drug:  diphenhydrAMINE  Take 25 mg by mouth daily as needed for Itching.     gabapentin 300 MG capsule  Commonly known as:  NEURONTIN  Take 300 mg by mouth once daily.     HYDROcodone-acetaminophen  mg per tablet  Commonly known as:  NORCO  TAKE ONE TABLET BY MOUTH EVERY 4 TO 6 HOURS AS NEEDED FOR PAIN     lisinopril 20 MG tablet  Commonly known as:  PRINIVIL,ZESTRIL  TAKE ONE TABLET (20 MG TOTAL) BY MOUTH ONCE DAILY        STOP taking these medications    aspirin 81 MG EC tablet  Commonly known as:  ECOTRIN     pravastatin 40 MG tablet  Commonly known as:  PRAVACHOL            Significant Diagnostic Studies: Labs:   CMP   Recent Labs   Lab 10/17/18  0545 10/18/18  0358   * 134*   K 4.1 3.7    103   CO2 19* 18*    117*   BUN 34* 35*   CREATININE 1.3 1.2   CALCIUM 8.9 9.2   PROT 6.9 6.4   ALBUMIN 1.8* 1.8*   BILITOT 2.6* 2.1*   ALKPHOS 519* 517*   AST 97* 99*   ALT 32 33   ANIONGAP 13 13   ESTGFRAFRICA >60.0 >60.0   EGFRNONAA 58.5* >60.0   , CBC   Recent Labs   Lab 10/17/18  0545 10/18/18  0359   WBC 14.48* 16.59*   HGB 12.5* 12.3*   HCT 38.8* 36.4*    200    and All labs within the past 24 hours have been reviewed    Pending Diagnostic Studies:     Procedure Component Value Units Date/Time    IR  CT Limited [[id number]] Resulted:  10/18/18 1631    Order Status:  Sent Lab Status:  In process Updated:  10/18/18 1632    IR Paracentesis with Imaging [[id number]] Resulted:  10/18/18 1453    Order Status:  Sent Lab Status:  In process Updated:  10/18/18 1630        Indwelling Lines/Drains at time of discharge:   Lines/Drains/Airways          None          Time spent on the  discharge of patient: 40 minutes  Patient was seen and examined on the date of discharge and determined to be suitable for discharge.         Oniel Burns DO  Department of Hospital Medicine  [facility]

## 2018-10-20 LAB
BACTERIA BLD CULT: NORMAL
BACTERIA BLD CULT: NORMAL

## 2018-10-22 NOTE — PT/OT/SLP DISCHARGE
Occupational Therapy Discharge Summary    Madhu Grant  MRN: [id number]   Principal Problem: Other ascites      Patient Discharged from acute Occupational Therapy on 10/18/2018.  Please refer to prior OT note dated 10/18/2018 for functional status.    Assessment:      Patient appropriate for care in another setting.    Objective:     GOALS:   Multidisciplinary Problems     Occupational Therapy Goals     Not on file          Multidisciplinary Problems (Resolved)        Problem: Occupational Therapy Goal    Goal Priority Disciplines Outcome Interventions   Occupational Therapy Goal   (Resolved)     OT, PT/OT Outcome(s) achieved    Description:  Goals to be met by: 10/23/2018    Patient will increase functional independence with ADLs by performing:    UE Dressing with Contact Guard Assistance.  LE Dressing with Contact Guard Assistance. -- Met for shorts (10/18)  Grooming while standing with Contact Guard Assistance.  Toileting from toilet with Contact Guard Assistance for hygiene and clothing management.   Toilet transfer to toilet with Contact Guard Assistance.  Pt will perform functional task while standing for three minutes with no instances of LOB.                       Reasons for Discontinuation of Therapy Services  Transfer to alternate level of care.      Plan:     Patient Discharged to: Home with Home Health Service    Coco Cassidy, OT  10/22/2018

## 2018-10-23 LAB — BACTERIA SPEC ANAEROBE CULT: NORMAL

## 2018-10-25 NOTE — PHYSICIAN QUERY
PT Name: Madhu Grant  MR #: [id number]     Physician Query Form - Documentation Clarification      CDS/: Sol Rodriges RN     Contact information:[email or web address]    This form is a permanent document in the medical record.     Query Date: October 25, 2018    By submitting this query, we are merely seeking further clarification of documentation. Please utilize your independent clinical judgment when addressing the question(s) below.    The Medical record reflects the following:    Supporting Clinical Findings Location in Medical Record   Recommended liver biopsy to diagnose malignancy.  IR performed liver bx.  Hem/onc consulted and agreed with liver bx.      Pt arrived to IR room 173 for paracentesis and liver biopsy        Procedure: Ultrasound guided paracentesis    Needle gauge: 18  Ultrasound guidance: yes  Puncture site: left lower quadrant  Fluid removed: 30(ml)  Fluid appearance: serous  Dressing: 4x4 sterile gauze  Complications: No  Estimated blood loss (mL): 2   Discharge summary          Registered nurse IR 10/18          IR note 10/18      Hospital medicine Procedure note 10/16   Specimen: Ascitic Fluid  FINAL PATHOLOGIC DIAGNOSIS  Negative for malignant cells  Few inflammatory cells present     Cytology report 10/15                                                                            Doctor, was a liver biopsy performed?    Provider Use Only    [ X  ] Yes  [  ] No  [  ]  Other explanation _________________________

## 2018-10-26 NOTE — PHYSICIAN QUERY
PT Name: Madhu Grant  MR #: [id number]     Physician Query Form - Documentation Clarification      CDS/: Sol Rodriges RN    Contact information:[email or web address]  Withdrawing after conversation with manager.  Path not on chart yet.  This form is a permanent document in the medical record.     Query Date: October 26, 2018    By submitting this query, we are merely seeking further clarification of documentation. Please utilize your independent clinical judgment when addressing the question(s) below.    The Medical record reflects the following:    Supporting Clinical Findings Location in Medical Record     Recommended liver biopsy to diagnose malignancy.  IR performed liver bx.  Hem/onc consulted and agreed with liver bx.      Pt arrived to IR room 173 for paracentesis and liver biopsy     Discharge summary          Registered nurse IR 10/18                                                                            Doctor, please provide us with a diagnosis based on the liver biopsy.    Provider Use Only                                                                                                                                  Clinically Undetermined

## 2018-11-05 ENCOUNTER — TELEPHONE (OUTPATIENT)
Dept: INTERNAL MEDICINE | Facility: CLINIC | Age: 63
End: 2018-11-05

## 2018-11-05 NOTE — TELEPHONE ENCOUNTER
----- Message from Raine Bowie sent at 2018  9:37 AM CST -----  Contact: [facility] Gary Sprague [phone]  Caller is calling to notify Dr. Sheridan of pt death and to have death certificate signed. Pt  on 18. Please call and advise

## 2018-11-05 NOTE — TELEPHONE ENCOUNTER
----- Message from Tram Lezama sent at 11/5/2018  2:47 PM CST -----  Contact: Laura Cartagena) [phone]  Laura states that [facility] confirmed with them that Dr. Sheridan would sign the pt's death certificate. Katarzyna would like to know if  would be signing the certificate through [facility].

## 2018-11-05 NOTE — TELEPHONE ENCOUNTER
----- Message from Naomi Matamoros sent at 2018 11:38 AM CST -----  Contact: [facility]/[phone]  Nuvia called in regards needing to find out if PCP will be able to sign death certificate, where and when to bring it. Please call and advise. Thank you!!!

## 2018-11-06 ENCOUNTER — TELEPHONE (OUTPATIENT)
Dept: INTERNAL MEDICINE | Facility: CLINIC | Age: 63
End: 2018-11-06

## 2018-11-06 NOTE — TELEPHONE ENCOUNTER
----- Message from Tyra Narvaez sent at 2018  8:44 AM CST -----  Contact: jose enrique Marino [facility] Home/[phone]  This is the 3rd message,   They need to find out if doctor will be singing patients death certificate...

## 2018-11-06 NOTE — TELEPHONE ENCOUNTER
Please see previous message as MD has advised that he will sign the death certificate.    MD is out of the office today and will not be able to sign the death certificate.    Left a message for Katarzyna to advise of the above.

## 2018-11-07 NOTE — PHYSICIAN QUERY
PT Name: Madhu Grant  MR #: [id number]     Physician Query Form - Diagnosis Clarification      CDS/: Sol Rodriges RN  Contact information: [email or web address]    This form is a permanent document in the medical record.     Query Date: November 7, 2018    By submitting this query, we are merely seeking further clarification of documentation.  Please utilize your independent clinical judgment when addressing the question(s) below.     The medical record contains the following:      Findings Supporting Clinical Information Location in Medical Record    Patient presented to ED with hypotension and 2/4 SIRS (HR>100, WBC>12) with suspected abdominal source   -Lactic acic 3.7 on admit. Improved to 1.7 after 1 L NS   -UA neg, CXR with possible right lung nodule   -vanc and zosyn in ED, Abx deescalated to Ceftriaxone/Metronidazole 10/16   -Blood cultures NGTD   -CT concerning for primary lung malignancy with lung and abdominal metastases   -diagnostic paracentesis showing SAAG 1.0, No organisms seen on gram stain, cultures pending   - If cultures remain negative, will stop abx     No WBC's no organisms seen     [facility] 10/17                                    Gram stain 10/15     Please clarify if the ____Sepsis_______________________ diagnosis has been:     Ruled In   X Ruled In, Now Resolved    Ruled Out    Other/Clarification of findings (please specify):    Clinically insignificant      Clinically undetermined     Please document in your progress notes daily for the duration of treatment, until resolved, and include in your discharge summary.

## 2018-11-08 ENCOUNTER — TELEPHONE (OUTPATIENT)
Dept: INTERNAL MEDICINE | Facility: CLINIC | Age: 63
End: 2018-11-08

## 2018-11-08 NOTE — TELEPHONE ENCOUNTER
----- Message from Timothy Roe sent at 2018  8:41 AM CST -----  Contact: Katarzyna From  Home / [phone]  The  home is requesting a fax of the death certificate before they pick it up. Please Advise.    Fax

## 2024-02-09 NOTE — SUBJECTIVE & OBJECTIVE
Review of Systems    Past Medical History:   Diagnosis Date    Chronic back pain     Diabetes mellitus, type 2     Hepatic steatosis     Hyperlipidemia     Hypertension     PVD (peripheral vascular disease)     Tobacco use disorder        Past Surgical History:   Procedure Laterality Date    left hand      TONSILLECTOMY      UMBILICAL HERNIA REPAIR         Family history of liver disease: No    Review of patient's allergies indicates:   Allergen Reactions    Iodine Hives    Latex, natural rubber Hives, Itching and Edema    Venom-honey bee Hives, Itching and Edema       Tobacco Use    Smoking status: Current Every Day Smoker    Smokeless tobacco: Current User   Substance and Sexual Activity    Alcohol use: Yes    Drug use: No    Sexual activity: Yes       Medications Prior to Admission   Medication Sig Dispense Refill Last Dose    aspirin (ECOTRIN) 81 MG EC tablet Take 81 mg by mouth once daily.   10/14/2018    diphenhydrAMINE (BENADRYL) 25 mg capsule Take 25 mg by mouth daily as needed for Itching.       gabapentin (NEURONTIN) 300 MG capsule Take 300 mg by mouth once daily.       HYDROcodone-acetaminophen (NORCO)  mg per tablet TAKE ONE TABLET BY MOUTH EVERY 4 TO 6 HOURS AS NEEDED FOR PAIN 90 tablet 0 10/14/2018    lisinopril (PRINIVIL,ZESTRIL) 20 MG tablet TAKE ONE TABLET (20 MG TOTAL) BY MOUTH ONCE DAILY 30 tablet 6 10/14/2018    pravastatin (PRAVACHOL) 40 MG tablet TAKE ONE TABLET BY MOUTH ONCE DAILY IN THE EVENING 30 tablet 6 10/14/2018       Objective:     Vital Signs (Most Recent):  Temp: 96.3 °F (35.7 °C) (10/17/18 1200)  Pulse: 84 (10/17/18 1200)  Resp: 20 (10/17/18 1200)  BP: 103/63 (10/17/18 1200)  SpO2: 95 % (10/17/18 1200) Vital Signs (24h Range):  Temp:  [96.3 °F (35.7 °C)-98.7 °F (37.1 °C)] 96.3 °F (35.7 °C)  Pulse:  [84-93] 84  Resp:  [16-20] 20  SpO2:  [92 %-97 %] 95 %  BP: ()/(57-76) 103/63     Weight: 76.6 kg (168 lb 14 oz) (10/15/18 0945)  Body mass index is  27.46 kg/m².    Physical Exam   Constitutional: He is oriented to person, place, and time. No distress.   HENT:   Head: Normocephalic and atraumatic.   Eyes: Scleral icterus is present.   Cardiovascular: Normal rate and regular rhythm.   Pulmonary/Chest: Effort normal and breath sounds normal.   Abdominal: Bowel sounds are normal. He exhibits distension. He exhibits no mass. There is no tenderness. There is no rebound and no guarding. No hernia.   Musculoskeletal: He exhibits edema (trace).   Neurological: He is alert and oriented to person, place, and time.   Skin: He is not diaphoretic.       MELD-Na score: 19 at 10/17/2018  5:45 AM  MELD score: 17 at 10/17/2018  5:45 AM  Calculated from:  Serum Creatinine: 1.3 mg/dL at 10/17/2018  5:45 AM  Serum Sodium: 135 mmol/L at 10/17/2018  5:45 AM  Total Bilirubin: 2.6 mg/dL at 10/17/2018  5:45 AM  INR(ratio): 1.5 at 10/15/2018 10:05 AM  Age: 63 years    Significant Labs:  CBC:   Recent Labs   Lab  10/17/18   0545   WBC  14.48*   RBC  4.33*   HGB  12.5*   HCT  38.8*   PLT  195     CMP:   Recent Labs   Lab  10/17/18   0545   GLU  100   CALCIUM  8.9   ALBUMIN  1.8*   PROT  6.9   NA  135*   K  4.1   CO2  19*   CL  103   BUN  34*   CREATININE  1.3   ALKPHOS  519*   ALT  32   AST  97*   BILITOT  2.6*     Coagulation:   Recent Labs   Lab  10/15/18   1005   INR  1.5*       Significant Imaging:  US: Reviewed  CT: Reviewed   PAST MEDICAL HISTORY:  HTN (hypertension)     Migraine      PAST MEDICAL HISTORY:  HTN (hypertension)     Migraine     TIA (transient ischemic attack)